# Patient Record
Sex: FEMALE | Race: WHITE | NOT HISPANIC OR LATINO
[De-identification: names, ages, dates, MRNs, and addresses within clinical notes are randomized per-mention and may not be internally consistent; named-entity substitution may affect disease eponyms.]

---

## 2018-10-11 ENCOUNTER — APPOINTMENT (OUTPATIENT)
Dept: OPHTHALMOLOGY | Facility: CLINIC | Age: 28
End: 2018-10-11
Payer: COMMERCIAL

## 2018-10-11 PROBLEM — Z00.00 ENCOUNTER FOR PREVENTIVE HEALTH EXAMINATION: Status: ACTIVE | Noted: 2018-10-11

## 2018-10-11 PROCEDURE — 92004 COMPRE OPH EXAM NEW PT 1/>: CPT

## 2018-10-11 PROCEDURE — 92015 DETERMINE REFRACTIVE STATE: CPT

## 2019-11-26 ENCOUNTER — APPOINTMENT (OUTPATIENT)
Dept: OPHTHALMOLOGY | Facility: CLINIC | Age: 29
End: 2019-11-26
Payer: COMMERCIAL

## 2019-11-26 ENCOUNTER — NON-APPOINTMENT (OUTPATIENT)
Age: 29
End: 2019-11-26

## 2019-11-26 PROCEDURE — 92014 COMPRE OPH EXAM EST PT 1/>: CPT

## 2020-02-18 ENCOUNTER — NON-APPOINTMENT (OUTPATIENT)
Age: 30
End: 2020-02-18

## 2020-02-18 ENCOUNTER — APPOINTMENT (OUTPATIENT)
Dept: OPHTHALMOLOGY | Facility: CLINIC | Age: 30
End: 2020-02-18
Payer: COMMERCIAL

## 2020-02-18 PROCEDURE — 92014 COMPRE OPH EXAM EST PT 1/>: CPT

## 2021-05-14 ENCOUNTER — APPOINTMENT (OUTPATIENT)
Dept: GASTROENTEROLOGY | Facility: CLINIC | Age: 31
End: 2021-05-14

## 2021-08-04 ENCOUNTER — NON-APPOINTMENT (OUTPATIENT)
Age: 31
End: 2021-08-04

## 2021-08-04 ENCOUNTER — APPOINTMENT (OUTPATIENT)
Dept: OPHTHALMOLOGY | Facility: CLINIC | Age: 31
End: 2021-08-04
Payer: SELF-PAY

## 2021-08-04 PROCEDURE — 92014 COMPRE OPH EXAM EST PT 1/>: CPT

## 2021-08-10 ENCOUNTER — EMERGENCY (EMERGENCY)
Facility: HOSPITAL | Age: 31
LOS: 1 days | Discharge: HOME | End: 2021-08-10
Attending: EMERGENCY MEDICINE | Admitting: EMERGENCY MEDICINE
Payer: SELF-PAY

## 2021-08-10 VITALS
WEIGHT: 293 LBS | OXYGEN SATURATION: 98 % | SYSTOLIC BLOOD PRESSURE: 141 MMHG | HEIGHT: 70 IN | HEART RATE: 92 BPM | RESPIRATION RATE: 19 BRPM | DIASTOLIC BLOOD PRESSURE: 83 MMHG | TEMPERATURE: 100 F

## 2021-08-10 VITALS
OXYGEN SATURATION: 99 % | DIASTOLIC BLOOD PRESSURE: 83 MMHG | SYSTOLIC BLOOD PRESSURE: 134 MMHG | RESPIRATION RATE: 18 BRPM | HEART RATE: 82 BPM

## 2021-08-10 DIAGNOSIS — F31.9 BIPOLAR DISORDER, UNSPECIFIED: ICD-10-CM

## 2021-08-10 DIAGNOSIS — R51.9 HEADACHE, UNSPECIFIED: ICD-10-CM

## 2021-08-10 DIAGNOSIS — H60.501 UNSPECIFIED ACUTE NONINFECTIVE OTITIS EXTERNA, RIGHT EAR: ICD-10-CM

## 2021-08-10 DIAGNOSIS — Z88.4 ALLERGY STATUS TO ANESTHETIC AGENT: ICD-10-CM

## 2021-08-10 DIAGNOSIS — H92.01 OTALGIA, RIGHT EAR: ICD-10-CM

## 2021-08-10 LAB
ALBUMIN SERPL ELPH-MCNC: 4.6 G/DL — SIGNIFICANT CHANGE UP (ref 3.3–5)
ALP SERPL-CCNC: 75 U/L — SIGNIFICANT CHANGE UP (ref 40–120)
ALT FLD-CCNC: 13 U/L — SIGNIFICANT CHANGE UP (ref 10–45)
ANION GAP SERPL CALC-SCNC: 10 MMOL/L — SIGNIFICANT CHANGE UP (ref 5–17)
AST SERPL-CCNC: 14 U/L — SIGNIFICANT CHANGE UP (ref 10–40)
BASOPHILS # BLD AUTO: 0.05 K/UL — SIGNIFICANT CHANGE UP (ref 0–0.2)
BASOPHILS NFR BLD AUTO: 0.4 % — SIGNIFICANT CHANGE UP (ref 0–2)
BILIRUB SERPL-MCNC: 0.3 MG/DL — SIGNIFICANT CHANGE UP (ref 0.2–1.2)
BUN SERPL-MCNC: 9 MG/DL — SIGNIFICANT CHANGE UP (ref 7–23)
CALCIUM SERPL-MCNC: 10.2 MG/DL — SIGNIFICANT CHANGE UP (ref 8.4–10.5)
CHLORIDE SERPL-SCNC: 100 MMOL/L — SIGNIFICANT CHANGE UP (ref 96–108)
CO2 SERPL-SCNC: 27 MMOL/L — SIGNIFICANT CHANGE UP (ref 22–31)
CREAT SERPL-MCNC: 0.71 MG/DL — SIGNIFICANT CHANGE UP (ref 0.5–1.3)
EOSINOPHIL # BLD AUTO: 0.03 K/UL — SIGNIFICANT CHANGE UP (ref 0–0.5)
EOSINOPHIL NFR BLD AUTO: 0.3 % — SIGNIFICANT CHANGE UP (ref 0–6)
GLUCOSE SERPL-MCNC: 111 MG/DL — HIGH (ref 70–99)
HCT VFR BLD CALC: 38.9 % — SIGNIFICANT CHANGE UP (ref 34.5–45)
HGB BLD-MCNC: 12.5 G/DL — SIGNIFICANT CHANGE UP (ref 11.5–15.5)
HIV 1+2 AB+HIV1 P24 AG SERPL QL IA: SIGNIFICANT CHANGE UP
IMM GRANULOCYTES NFR BLD AUTO: 0.3 % — SIGNIFICANT CHANGE UP (ref 0–1.5)
LYMPHOCYTES # BLD AUTO: 1.57 K/UL — SIGNIFICANT CHANGE UP (ref 1–3.3)
LYMPHOCYTES # BLD AUTO: 13.1 % — SIGNIFICANT CHANGE UP (ref 13–44)
MCHC RBC-ENTMCNC: 28.8 PG — SIGNIFICANT CHANGE UP (ref 27–34)
MCHC RBC-ENTMCNC: 32.1 GM/DL — SIGNIFICANT CHANGE UP (ref 32–36)
MCV RBC AUTO: 89.6 FL — SIGNIFICANT CHANGE UP (ref 80–100)
MONOCYTES # BLD AUTO: 0.55 K/UL — SIGNIFICANT CHANGE UP (ref 0–0.9)
MONOCYTES NFR BLD AUTO: 4.6 % — SIGNIFICANT CHANGE UP (ref 2–14)
NEUTROPHILS # BLD AUTO: 9.75 K/UL — HIGH (ref 1.8–7.4)
NEUTROPHILS NFR BLD AUTO: 81.3 % — HIGH (ref 43–77)
NRBC # BLD: 0 /100 WBCS — SIGNIFICANT CHANGE UP (ref 0–0)
PLATELET # BLD AUTO: 319 K/UL — SIGNIFICANT CHANGE UP (ref 150–400)
POTASSIUM SERPL-MCNC: 4.3 MMOL/L — SIGNIFICANT CHANGE UP (ref 3.5–5.3)
POTASSIUM SERPL-SCNC: 4.3 MMOL/L — SIGNIFICANT CHANGE UP (ref 3.5–5.3)
PROT SERPL-MCNC: 7.7 G/DL — SIGNIFICANT CHANGE UP (ref 6–8.3)
RBC # BLD: 4.34 M/UL — SIGNIFICANT CHANGE UP (ref 3.8–5.2)
RBC # FLD: 13.4 % — SIGNIFICANT CHANGE UP (ref 10.3–14.5)
SODIUM SERPL-SCNC: 137 MMOL/L — SIGNIFICANT CHANGE UP (ref 135–145)
WBC # BLD: 11.98 K/UL — HIGH (ref 3.8–10.5)
WBC # FLD AUTO: 11.98 K/UL — HIGH (ref 3.8–10.5)

## 2021-08-10 PROCEDURE — G1004: CPT

## 2021-08-10 PROCEDURE — 96375 TX/PRO/DX INJ NEW DRUG ADDON: CPT | Mod: XU

## 2021-08-10 PROCEDURE — 70487 CT MAXILLOFACIAL W/DYE: CPT | Mod: MG

## 2021-08-10 PROCEDURE — 85025 COMPLETE CBC W/AUTO DIFF WBC: CPT

## 2021-08-10 PROCEDURE — 87389 HIV-1 AG W/HIV-1&-2 AB AG IA: CPT

## 2021-08-10 PROCEDURE — 80053 COMPREHEN METABOLIC PANEL: CPT

## 2021-08-10 PROCEDURE — 36415 COLL VENOUS BLD VENIPUNCTURE: CPT

## 2021-08-10 PROCEDURE — 99284 EMERGENCY DEPT VISIT MOD MDM: CPT | Mod: 25

## 2021-08-10 PROCEDURE — 99285 EMERGENCY DEPT VISIT HI MDM: CPT

## 2021-08-10 PROCEDURE — 70487 CT MAXILLOFACIAL W/DYE: CPT | Mod: 26,MG

## 2021-08-10 PROCEDURE — 96374 THER/PROPH/DIAG INJ IV PUSH: CPT | Mod: XU

## 2021-08-10 RX ORDER — OXYCODONE AND ACETAMINOPHEN 5; 325 MG/1; MG/1
1 TABLET ORAL ONCE
Refills: 0 | Status: DISCONTINUED | OUTPATIENT
Start: 2021-08-10 | End: 2021-08-10

## 2021-08-10 RX ORDER — CIPROFLOXACIN AND DEXAMETHASONE 3; 1 MG/ML; MG/ML
2 SUSPENSION/ DROPS AURICULAR (OTIC) ONCE
Refills: 0 | Status: DISCONTINUED | OUTPATIENT
Start: 2021-08-10 | End: 2021-08-10

## 2021-08-10 RX ORDER — CIPROFLOXACIN AND DEXAMETHASONE 3; 1 MG/ML; MG/ML
4 SUSPENSION/ DROPS AURICULAR (OTIC)
Qty: 100 | Refills: 0
Start: 2021-08-10 | End: 2021-08-19

## 2021-08-10 RX ORDER — MORPHINE SULFATE 50 MG/1
1 CAPSULE, EXTENDED RELEASE ORAL ONCE
Refills: 0 | Status: DISCONTINUED | OUTPATIENT
Start: 2021-08-10 | End: 2021-08-10

## 2021-08-10 RX ORDER — KETOROLAC TROMETHAMINE 30 MG/ML
15 SYRINGE (ML) INJECTION ONCE
Refills: 0 | Status: DISCONTINUED | OUTPATIENT
Start: 2021-08-10 | End: 2021-08-10

## 2021-08-10 RX ORDER — CIPROFLOXACIN LACTATE 400MG/40ML
1 VIAL (ML) INTRAVENOUS
Qty: 28 | Refills: 0
Start: 2021-08-10 | End: 2021-08-23

## 2021-08-10 RX ORDER — CIPROFLOXACIN HCL 0.3 %
2 DROPS OPHTHALMIC (EYE) ONCE
Refills: 0 | Status: COMPLETED | OUTPATIENT
Start: 2021-08-10 | End: 2021-08-10

## 2021-08-10 RX ADMIN — Medication 15 MILLIGRAM(S): at 11:03

## 2021-08-10 RX ADMIN — OXYCODONE AND ACETAMINOPHEN 1 TABLET(S): 5; 325 TABLET ORAL at 16:33

## 2021-08-10 RX ADMIN — Medication 2 DROP(S): at 16:23

## 2021-08-10 RX ADMIN — MORPHINE SULFATE 1 MILLIGRAM(S): 50 CAPSULE, EXTENDED RELEASE ORAL at 15:41

## 2021-08-10 RX ADMIN — MORPHINE SULFATE 1 MILLIGRAM(S): 50 CAPSULE, EXTENDED RELEASE ORAL at 16:23

## 2021-08-10 RX ADMIN — Medication 15 MILLIGRAM(S): at 12:00

## 2021-08-10 RX ADMIN — OXYCODONE AND ACETAMINOPHEN 1 TABLET(S): 5; 325 TABLET ORAL at 16:25

## 2021-08-10 NOTE — ED ADULT NURSE NOTE - OBJECTIVE STATEMENT
pt is 31y female, here for RT ear swelling and pain and vertigo since Saturday, pt denies any fevers or respiratory sx, pt ambulatory with steady gait, R ear noticeably swollen but no redness or drainage noted, NAD present

## 2021-08-10 NOTE — ED PROVIDER NOTE - CLINICAL SUMMARY MEDICAL DECISION MAKING FREE TEXT BOX
30 y/o F w/ PMHx bipolar disorder, endometriosis presents today referred by urgent care for 3 days of right ear pain/HA.  on exam pt afebrile, appears uncomfortable but nad, right ear w/ swelling of ear canal, unable to visualize TM secondary to pain and swelling of canal, yellow discharge noted, no bleeding, tenderness w/ movement of pinna; tenderness over mastoid and TMJ joint- no bulging/skin changes; left TM and canal clear; no maxilla or mandibular swelling, no trismus; uvula midline, no tonsilar exudates;  neck w/ right cervical and submandibular lymphadenopathy, supple, no meningeal signs.  likely severe AOE but concern for mastoiditis or deeper infection.  will obtain labs, CT maxillofacial and toradol for pain.

## 2021-08-10 NOTE — ED PROVIDER NOTE - NSFOLLOWUPINSTRUCTIONS_ED_ALL_ED_FT
Please take full course of oral and otic antibiotics as prescribed.      Take tylenol 650mg or motrin 400-800mg as needed every 4-6 hours for pain.   You can take percocet for severe pain    Call to make appointment with ENT specialist for Friday.    You may call our referrals coordinator at [phone] Monday to Friday 11am-7pm for assistance with making an appointment      Otitis Externa       Otitis externa is an infection of the outer ear canal. The outer ear canal is the area between the outside of the ear and the eardrum. Otitis externa is sometimes called swimmer's ear.      What are the causes?  Common causes of this condition include:  •Swimming in dirty water.      •Moisture in the ear.      •An injury to the inside of the ear.      •An object stuck in the ear.      •A cut or scrape on the outside of the ear.        What increases the risk?    You are more likely to develop this condition if you go swimming often.      What are the signs or symptoms?  The first symptom of this condition is often itching in the ear. Later symptoms of the condition include:  •Swelling of the ear.      •Redness in the ear.      •Ear pain. The pain may get worse when you pull on your ear.      •Pus coming from the ear.        How is this diagnosed?    This condition may be diagnosed by examining the ear and testing fluid from the ear for bacteria and funguses.      How is this treated?  This condition may be treated with:  •Antibiotic ear drops. These are often given for 10–14 days.      •Medicines to reduce itching and swelling.        Follow these instructions at home:    •If you were prescribed antibiotic ear drops, use them as told by your health care provider. Do not stop using the antibiotic even if your condition improves.      •Take over-the-counter and prescription medicines only as told by your health care provider.      •Avoid getting water in your ears as told by your health care provider. This may include avoiding swimming or water sports for a few days.      •Keep all follow-up visits as told by your health care provider. This is important.        How is this prevented?    •Keep your ears dry. Use the corner of a towel to dry your ears after you swim or bathe.      •Avoid scratching or putting things in your ear. Doing these things can damage the ear canal or remove the protective wax that lines it, which makes it easier for bacteria and funguses to grow.      •Avoid swimming in lakes, polluted water, or pools that may not have enough chlorine.        Contact a health care provider if:    •You have a fever.      •Your ear is still red, swollen, painful, or draining pus after 3 days.      •Your redness, swelling, or pain gets worse.      •You have a severe headache.      •You have redness, swelling, pain, or tenderness in the area behind your ear.        Summary    •Otitis externa is an infection of the outer ear canal.      •Common causes include swimming in dirty water, moisture in the ear, or a cut or scrape in the ear.      •Symptoms include pain, redness, and swelling of the ear.      •If you were prescribed antibiotic ear drops, use them as told by your health care provider. Do not stop using the antibiotic even if your condition improves.      This information is not intended to replace advice given to you by your health care provider. Make sure you discuss any questions you have with your health care provider.

## 2021-08-10 NOTE — ED ADULT TRIAGE NOTE - CHIEF COMPLAINT QUOTE
"I started to have pain to my right ear 3 days ago and today at urgent care they told me to come to ER to get CT and see ENT".

## 2021-08-10 NOTE — ED PROVIDER NOTE - OBJECTIVE STATEMENT
32 y/o F w/ PMHx bipolar disorder, endometriosis presents today referred by urgent care for 3 days of right ear pain. Patient reports that she was swimming this weekend, now has dull, aching pain in her right ear radiating into her right jaw behind her ear w/ difficulty opening her mouth secondary to pain and throat discomfort w/ swallowing. Patient used over the counter ear drops without relief, went to urgent care and was referred to the ED w/ concern for mastoiditis. Patient also notes greenish discharge from ear since yesterday. Denies difficulty swallowing or breathing, fevers, chills, dizziness, fainting, vision changes, neck pain or stiffness, chest pain, SOB, vomiting, or trauma. Patient also notes diffuse right-sided headache w/ nausea. 32 y/o F w/ PMHx bipolar disorder, endometriosis presents today referred by urgent care for 3 days of right ear pain/HA. Patient reports that she was swimming this weekend, now has dull, throbbing pain in her right ear radiating into her right jaw behind her ear w/ difficulty opening her mouth secondary to pain and throat discomfort w/ swallowing. Patient used over the counter ear drops without relief, went to urgent care and was referred to the ED w/ concern for mastoiditis. Patient also notes greenish discharge from ear since yesterday and nausea. Denies difficulty swallowing or breathing, fevers, chills, dizziness, fainting, vision changes, neck pain or stiffness, chest pain, SOB, vomiting, or trauma.

## 2021-08-10 NOTE — ED PROVIDER NOTE - PROGRESS NOTE DETAILS
CT concern for early malignant otitis externa.  seen by ENT and recommend PO/otic ciprox and f/u with outpt on Friday.  rxs sent and discussed strict return parameters

## 2021-08-10 NOTE — ED PROVIDER NOTE - DISPOSITION TYPE
Billing Type: Third-Party Bill Bill For Surgical Tray: no Expected Date Of Service: 12/10/2019 Performing Laboratory: [id number] DISCHARGE

## 2021-08-10 NOTE — CONSULT NOTE ADULT - SUBJECTIVE AND OBJECTIVE BOX
31F w/ PMH Crohn's disease presenting for right ear pain of 4 days duration. Patient states that the pain started Saturday and was associated with headache at that time. It gradually progressed to become severe pain that radiates down the neck and to the jaw. She says her hearing is decreased on the right and she feels off balance. States that she's been having regular drainage from the ear and pain with opening her mouth that has stopped her from eating. Denies history of ear surgery or ear infections. Denies fevers, history of malignancy or immunosuppression. States she takes 5ASA for her Crohn's disease and no immunosuppressants.    Allergies    ketamine (Unknown)    Intolerances    PAST MEDICAL & SURGICAL HISTORY:      MEDICATIONS:  Antiinfectives:     Hematologic/Anticoagulation:    Pain medications/Neuro:    IV fluids:    Endocrine Medications:     All other standing medications:     All other PRN medications:        REVIEW OF SYSTEMS:     LABS:  CBC-                        12.5   11.98 )-----------( 319      ( 10 Aug 2021 11:16 )             38.9         08-10    137  |  100  |  9   ----------------------------<  111<H>  4.3   |  27  |  0.71    Ca    10.2      10 Aug 2021 11:16    TPro  7.7  /  Alb  4.6  /  TBili  0.3  /  DBili  x   /  AST  14  /  ALT  13  /  AlkPhos  75  08-10    Coagulation Studies-    Endocrine Panel-  --  --  10.2 mg/dL      Vital Signs Last 24 Hrs  T(C): 37.2 (10 Aug 2021 13:31), Max: 37.7 (10 Aug 2021 10:04)  T(F): 99 (10 Aug 2021 13:31), Max: 99.9 (10 Aug 2021 10:04)  HR: 82 (10 Aug 2021 16:23) (81 - 92)  BP: 134/83 (10 Aug 2021 16:23) (115/78 - 141/83)  BP(mean): --  RR: 18 (10 Aug 2021 16:23) (18 - 19)  SpO2: 99% (10 Aug 2021 16:23) (98% - 99%)    PHYSICAL EXAM:  ENT EXAM-   Constitutional: Well-developed, well-nourished. No hoarseness.     Head:  normocephalic, atraumatic.   AD: mastoid tender, flat, no erythema, Pinna with some erythema in abdoul, tender to palpation, TMJ tender to palpation, significant erythema and edema of R EAC, small amount of purulent drainage- suctioned, small portion of superior, posterior TM visible, non-erythematous, no bulging. Ear wick placed into right EAC.  AS: mastoid flat, non-tender, pinna clear, non-tender, no erythema, EAC clear, no erythema, no lesions, TM flat, no erythema, no perforation, no effusion  Nose:  Septum intact, Inferior turbinates normal bilateral  OC/OP:  Tonsils 3+. Floor of mouth, buccal mucosa, lips, hard palate, soft palate, uvula, posterior pharyngeal wall normal.  Mucosa moist.  Neck:  R neck tender to palpation at level II, level III  Lymph: palpable 1cm LAD in R level III,     MULTISYSTEM EXAM-  Neuro/Psych:  A&O x 3.  Mood stable.  Affect bright.  Cranial nerves: 2-12 grossly intact bilaterally.  Eyes:  EOMI, no nystagmus.  Pulm:  No dyspnea, non-labored breathing  Cardiovascular: Carotid pulses 2+ bilaterally.  No periphreal edema.  Skin:  No rash or lesions on exposed skin of head/neck    RADIOLOGY & ADDITIONAL STUDIES:    EXAM:  CT MAXILLOFACIAL  IC                          PROCEDURE DATE:  08/10/2021          INTERPRETATION:  PROCEDURE: CT maxillofacial with contrast    INDICATION: Ear pain; rule out mastoiditis    TECHNIQUE: Multiple thin section axial images were obtained from the hyoid bone to the superior aspect of the frontal sinuses following the intravenous administration of 85 ml of Optiray 350. These images were used to create 2 mm thick sagittal, axial and coronal reformatted images through the facial region. The images were reviewed in bone and soft-tissue windows.    COMPARISON: None    FINDINGS: The CT examination demonstrates circumferential swelling with enhancing soft tissue along the right external auditory canal (series 4 image 44 and Key image 1). The enhancing soft tissues extends along the right preauricular soft tissues. There is no fluid collection or discrete abscess. There is no bony erosion involving the external auditory canal or adjacent mastoid segment.    However, there is asymmetric widening of the right temporomandibular joint with a suggestion of enhancing soft tissue within the posterior aspect of the right temporomandibular joint space (series 4 images 41-42). There is flattening of the right condylar head with beaking which may be secondary to underlying TMJ disease. No cortical erosion is identified.    The right tympanic membrane also appears thickened. There is soft tissue within the right mesotympanum and hypotympanum. There also is soft tissue opacification and fluid within right mastoid air cells. The mastoid septae appear intact.    These findings are suggestive of early necrotizing otitis externum given the extension to the right temporomandibular joint space as well as involvement of the mastoid segment.    There are arm multiple small preauricular and periparotid lymph nodes. All the lymph nodes are homogeneous without necrosis or cystic change.    Evaluation of the orbits demonstrates the globes to be normal in contour. The lenses are located. The extraocular muscles and optic nerve sheath appear within normal limits. The retrobulbar fat is intact. The osseous structures demonstrate no fractures.    Limited evaluation of the brain parenchyma demonstrates no abnormalities or abnormal enhancement.    IMPRESSION: Findings suggestive of early right necrotizing otitis externa.    --- End of Report ---      Thank you for the opportunity to participate in the care of this patient.      ASHELY VINCENT MD; Attending Radiologist  This document has been electronically signed. Aug 10 2021  1:38PM      A/P:  31y Female w/ PMH Crohn's disease presenting with severe otitis externa with pain with pain and tenderness extending outside the EAC. Ear wick placed to right EAC.    Plan:  Ciprodex drops (4 drops) to right ear BID x10 days  Ciprofloxacin PO x14 days  Tylenol/Motrin for pain control PRN  Follow up with Dr. Stas Wilder for ear wick removal Friday, 8/13  Please page ENT with any concerns/questions    Discussed with Dr. Wilder

## 2021-08-10 NOTE — ED ADULT NURSE NOTE - NSIMPLEMENTINTERV_GEN_ALL_ED
Implemented All Universal Safety Interventions:  [place] to call system. Call bell, personal items and telephone within reach. Instruct patient to call for assistance. Room bathroom lighting operational. Non-slip footwear when patient is off stretcher. Physically safe environment: no spills, clutter or unnecessary equipment. Stretcher in lowest position, wheels locked, appropriate side rails in place.

## 2021-08-10 NOTE — ED PROVIDER NOTE - PHYSICAL EXAMINATION
Vitals reviewed  Gen: well appearing, nad, speaking in full sentences  Skin: wwp, no rash/lesions  HEENT: ncat, eomi, mmm; right ear w/ swelling of ear canal, unable to visualize TM secondary to pain and swelling of canal, yellow discharge noted, no bleeding, tenderness w/ movement of pinna; tenderness over mastoid and TMJ joint; left ear TM and canal clear; no maxilla or mandibular swelling, no trismus; uvula midline, no tonsilar exudates; neck w/ right cervical and mandibula lymphadenopathy, supple, no meningeal signs  CV: rrr, no audible m/r/g  Resp: symmetrical expansion, ctab, no w/r/r  Abd: nondistended, soft/nt  Ext: FROM throughout, no peripheral edema  Neuro: alert/oriented, no focal deficits, steady gait Vitals reviewed  Gen: appears uncomfortable but nad, speaking in full sentences  Skin: wwp, no rash/lesions  HEENT: ncat, eomi, mmm; right ear w/ swelling of ear canal, unable to visualize TM secondary to pain and swelling of canal, yellow discharge noted, no bleeding, tenderness w/ movement of pinna; tenderness over mastoid and TMJ joint- no bulging/skin changes; left TM and canal clear; no maxilla or mandibular swelling, no trismus; uvula midline, no tonsilar exudates  Neck w/ right cervical and submandibular lymphadenopathy, supple, no meningeal signs  CV: rrr, no audible m/r/g  Resp: symmetrical expansion, ctab, no w/r/r  Ext: FROM throughout, no peripheral edema  Neuro: alert/oriented, no focal deficits, steady gait

## 2021-08-10 NOTE — ED PROVIDER NOTE - ATTENDING CONTRIBUTION TO CARE
32 y/o F w/ PMHx bipolar disorder, endometriosis presents today referred by urgent care for 3 days of right ear pain/HA.  on exam pt afebrile, appears uncomfortable but nad, right ear w/ swelling of ear canal, unable to visualize TM secondary to pain and swelling of canal, yellow discharge noted, no bleeding, tenderness w/ movement of pinna; tenderness over mastoid and TMJ joint- no bulging/skin changes; left TM and canal clear; no maxilla or mandibular swelling, no trismus; uvula midline, no tonsilar exudates;  neck w/ right cervical and submandibular lymphadenopathy, supple, no meningeal signs.  likely severe AOE but concern for mastoiditis or deeper infection.  will obtain labs, CT maxillofacial and toradol for pain.    Agree with above note as documented by PA.  CT performed with findings suggestive of early right necrotizing otitis externa.     ENT consulted in ED and ear wick placed in addition to prescribing ciprodex and oral cipro.  Pt instructed to follow up with ENT as outpt and informed of symptoms which would warrant return.

## 2021-08-10 NOTE — ED ADULT NURSE NOTE - CHPI ED NUR SYMPTOMS NEG
no bleeding gums/no chills/no fever/no loss of consciousness/no numbness/no syncope/no vomiting/no weakness

## 2021-08-11 ENCOUNTER — INPATIENT (INPATIENT)
Facility: HOSPITAL | Age: 31
LOS: 4 days | Discharge: HOME | DRG: 872 | End: 2021-08-16
Attending: STUDENT IN AN ORGANIZED HEALTH CARE EDUCATION/TRAINING PROGRAM | Admitting: STUDENT IN AN ORGANIZED HEALTH CARE EDUCATION/TRAINING PROGRAM
Payer: COMMERCIAL

## 2021-08-11 VITALS
HEIGHT: 70 IN | HEART RATE: 97 BPM | SYSTOLIC BLOOD PRESSURE: 132 MMHG | WEIGHT: 293 LBS | OXYGEN SATURATION: 95 % | DIASTOLIC BLOOD PRESSURE: 79 MMHG | RESPIRATION RATE: 18 BRPM | TEMPERATURE: 100 F

## 2021-08-11 DIAGNOSIS — K51.90 ULCERATIVE COLITIS, UNSPECIFIED, WITHOUT COMPLICATIONS: ICD-10-CM

## 2021-08-11 DIAGNOSIS — F31.9 BIPOLAR DISORDER, UNSPECIFIED: ICD-10-CM

## 2021-08-11 DIAGNOSIS — R63.8 OTHER SYMPTOMS AND SIGNS CONCERNING FOOD AND FLUID INTAKE: ICD-10-CM

## 2021-08-11 DIAGNOSIS — A41.9 SEPSIS, UNSPECIFIED ORGANISM: ICD-10-CM

## 2021-08-11 DIAGNOSIS — H60.21 MALIGNANT OTITIS EXTERNA, RIGHT EAR: ICD-10-CM

## 2021-08-11 DIAGNOSIS — N80.9 ENDOMETRIOSIS, UNSPECIFIED: ICD-10-CM

## 2021-08-11 LAB
ALBUMIN SERPL ELPH-MCNC: 4.3 G/DL — SIGNIFICANT CHANGE UP (ref 3.3–5)
ALP SERPL-CCNC: 71 U/L — SIGNIFICANT CHANGE UP (ref 40–120)
ALT FLD-CCNC: 14 U/L — SIGNIFICANT CHANGE UP (ref 10–45)
ANION GAP SERPL CALC-SCNC: 12 MMOL/L — SIGNIFICANT CHANGE UP (ref 5–17)
AST SERPL-CCNC: 14 U/L — SIGNIFICANT CHANGE UP (ref 10–40)
BASOPHILS # BLD AUTO: 0.04 K/UL — SIGNIFICANT CHANGE UP (ref 0–0.2)
BASOPHILS NFR BLD AUTO: 0.4 % — SIGNIFICANT CHANGE UP (ref 0–2)
BILIRUB SERPL-MCNC: 0.3 MG/DL — SIGNIFICANT CHANGE UP (ref 0.2–1.2)
BUN SERPL-MCNC: 6 MG/DL — LOW (ref 7–23)
CALCIUM SERPL-MCNC: 9.8 MG/DL — SIGNIFICANT CHANGE UP (ref 8.4–10.5)
CHLORIDE SERPL-SCNC: 98 MMOL/L — SIGNIFICANT CHANGE UP (ref 96–108)
CO2 SERPL-SCNC: 25 MMOL/L — SIGNIFICANT CHANGE UP (ref 22–31)
CREAT SERPL-MCNC: 0.69 MG/DL — SIGNIFICANT CHANGE UP (ref 0.5–1.3)
EOSINOPHIL # BLD AUTO: 0.01 K/UL — SIGNIFICANT CHANGE UP (ref 0–0.5)
EOSINOPHIL NFR BLD AUTO: 0.1 % — SIGNIFICANT CHANGE UP (ref 0–6)
GLUCOSE SERPL-MCNC: 138 MG/DL — HIGH (ref 70–99)
HCT VFR BLD CALC: 37.7 % — SIGNIFICANT CHANGE UP (ref 34.5–45)
HGB BLD-MCNC: 12.1 G/DL — SIGNIFICANT CHANGE UP (ref 11.5–15.5)
IMM GRANULOCYTES NFR BLD AUTO: 0.3 % — SIGNIFICANT CHANGE UP (ref 0–1.5)
LACTATE SERPL-SCNC: 1.1 MMOL/L — SIGNIFICANT CHANGE UP (ref 0.5–2)
LYMPHOCYTES # BLD AUTO: 1.69 K/UL — SIGNIFICANT CHANGE UP (ref 1–3.3)
LYMPHOCYTES # BLD AUTO: 17.2 % — SIGNIFICANT CHANGE UP (ref 13–44)
MCHC RBC-ENTMCNC: 28.4 PG — SIGNIFICANT CHANGE UP (ref 27–34)
MCHC RBC-ENTMCNC: 32.1 GM/DL — SIGNIFICANT CHANGE UP (ref 32–36)
MCV RBC AUTO: 88.5 FL — SIGNIFICANT CHANGE UP (ref 80–100)
MONOCYTES # BLD AUTO: 0.57 K/UL — SIGNIFICANT CHANGE UP (ref 0–0.9)
MONOCYTES NFR BLD AUTO: 5.8 % — SIGNIFICANT CHANGE UP (ref 2–14)
NEUTROPHILS # BLD AUTO: 7.5 K/UL — HIGH (ref 1.8–7.4)
NEUTROPHILS NFR BLD AUTO: 76.2 % — SIGNIFICANT CHANGE UP (ref 43–77)
NRBC # BLD: 0 /100 WBCS — SIGNIFICANT CHANGE UP (ref 0–0)
PLATELET # BLD AUTO: 292 K/UL — SIGNIFICANT CHANGE UP (ref 150–400)
POTASSIUM SERPL-MCNC: 3.7 MMOL/L — SIGNIFICANT CHANGE UP (ref 3.5–5.3)
POTASSIUM SERPL-SCNC: 3.7 MMOL/L — SIGNIFICANT CHANGE UP (ref 3.5–5.3)
PROT SERPL-MCNC: 7.4 G/DL — SIGNIFICANT CHANGE UP (ref 6–8.3)
RBC # BLD: 4.26 M/UL — SIGNIFICANT CHANGE UP (ref 3.8–5.2)
RBC # FLD: 13.7 % — SIGNIFICANT CHANGE UP (ref 10.3–14.5)
SARS-COV-2 RNA SPEC QL NAA+PROBE: SIGNIFICANT CHANGE UP
SODIUM SERPL-SCNC: 135 MMOL/L — SIGNIFICANT CHANGE UP (ref 135–145)
WBC # BLD: 9.84 K/UL — SIGNIFICANT CHANGE UP (ref 3.8–10.5)
WBC # FLD AUTO: 9.84 K/UL — SIGNIFICANT CHANGE UP (ref 3.8–10.5)

## 2021-08-11 PROCEDURE — 99285 EMERGENCY DEPT VISIT HI MDM: CPT

## 2021-08-11 PROCEDURE — 99223 1ST HOSP IP/OBS HIGH 75: CPT | Mod: GC

## 2021-08-11 RX ORDER — CIPROFLOXACIN LACTATE 400MG/40ML
400 VIAL (ML) INTRAVENOUS ONCE
Refills: 0 | Status: DISCONTINUED | OUTPATIENT
Start: 2021-08-11 | End: 2021-08-11

## 2021-08-11 RX ORDER — LAMOTRIGINE 25 MG/1
300 TABLET, ORALLY DISINTEGRATING ORAL DAILY
Refills: 0 | Status: DISCONTINUED | OUTPATIENT
Start: 2021-08-11 | End: 2021-08-16

## 2021-08-11 RX ORDER — LAMOTRIGINE 25 MG/1
300 TABLET, ORALLY DISINTEGRATING ORAL
Qty: 0 | Refills: 0 | DISCHARGE

## 2021-08-11 RX ORDER — SODIUM CHLORIDE 9 MG/ML
1000 INJECTION INTRAMUSCULAR; INTRAVENOUS; SUBCUTANEOUS ONCE
Refills: 0 | Status: DISCONTINUED | OUTPATIENT
Start: 2021-08-11 | End: 2021-08-12

## 2021-08-11 RX ORDER — LEVOTHYROXINE SODIUM 125 MCG
1 TABLET ORAL
Qty: 0 | Refills: 0 | DISCHARGE

## 2021-08-11 RX ORDER — MORPHINE SULFATE 50 MG/1
4 CAPSULE, EXTENDED RELEASE ORAL ONCE
Refills: 0 | Status: DISCONTINUED | OUTPATIENT
Start: 2021-08-11 | End: 2021-08-11

## 2021-08-11 RX ORDER — VANCOMYCIN HCL 1 G
2000 VIAL (EA) INTRAVENOUS EVERY 12 HOURS
Refills: 0 | Status: DISCONTINUED | OUTPATIENT
Start: 2021-08-12 | End: 2021-08-12

## 2021-08-11 RX ORDER — LEVOTHYROXINE SODIUM 125 MCG
25 TABLET ORAL DAILY
Refills: 0 | Status: DISCONTINUED | OUTPATIENT
Start: 2021-08-11 | End: 2021-08-16

## 2021-08-11 RX ORDER — CIPROFLOXACIN HCL 0.3 %
4 DROPS OPHTHALMIC (EYE) EVERY 12 HOURS
Refills: 0 | Status: DISCONTINUED | OUTPATIENT
Start: 2021-08-11 | End: 2021-08-12

## 2021-08-11 RX ORDER — PIPERACILLIN AND TAZOBACTAM 4; .5 G/20ML; G/20ML
4.5 INJECTION, POWDER, LYOPHILIZED, FOR SOLUTION INTRAVENOUS ONCE
Refills: 0 | Status: COMPLETED | OUTPATIENT
Start: 2021-08-11 | End: 2021-08-11

## 2021-08-11 RX ORDER — CIPROFLOXACIN AND DEXAMETHASONE 3; 1 MG/ML; MG/ML
4 SUSPENSION/ DROPS AURICULAR (OTIC)
Refills: 0 | Status: DISCONTINUED | OUTPATIENT
Start: 2021-08-11 | End: 2021-08-11

## 2021-08-11 RX ORDER — VANCOMYCIN HCL 1 G
2000 VIAL (EA) INTRAVENOUS ONCE
Refills: 0 | Status: COMPLETED | OUTPATIENT
Start: 2021-08-11 | End: 2021-08-11

## 2021-08-11 RX ORDER — SODIUM CHLORIDE 9 MG/ML
1000 INJECTION INTRAMUSCULAR; INTRAVENOUS; SUBCUTANEOUS ONCE
Refills: 0 | Status: COMPLETED | OUTPATIENT
Start: 2021-08-11 | End: 2021-08-11

## 2021-08-11 RX ORDER — ESCITALOPRAM OXALATE 10 MG/1
1 TABLET, FILM COATED ORAL
Qty: 0 | Refills: 0 | DISCHARGE

## 2021-08-11 RX ORDER — ESCITALOPRAM OXALATE 10 MG/1
20 TABLET, FILM COATED ORAL DAILY
Refills: 0 | Status: DISCONTINUED | OUTPATIENT
Start: 2021-08-11 | End: 2021-08-16

## 2021-08-11 RX ORDER — PIPERACILLIN AND TAZOBACTAM 4; .5 G/20ML; G/20ML
4.5 INJECTION, POWDER, LYOPHILIZED, FOR SOLUTION INTRAVENOUS EVERY 6 HOURS
Refills: 0 | Status: DISCONTINUED | OUTPATIENT
Start: 2021-08-11 | End: 2021-08-14

## 2021-08-11 RX ORDER — VANCOMYCIN HCL 1 G
VIAL (EA) INTRAVENOUS
Refills: 0 | Status: DISCONTINUED | OUTPATIENT
Start: 2021-08-11 | End: 2021-08-12

## 2021-08-11 RX ORDER — ACETAMINOPHEN 500 MG
650 TABLET ORAL EVERY 6 HOURS
Refills: 0 | Status: DISCONTINUED | OUTPATIENT
Start: 2021-08-11 | End: 2021-08-13

## 2021-08-11 RX ORDER — KETOROLAC TROMETHAMINE 30 MG/ML
15 SYRINGE (ML) INJECTION ONCE
Refills: 0 | Status: DISCONTINUED | OUTPATIENT
Start: 2021-08-11 | End: 2021-08-11

## 2021-08-11 RX ADMIN — Medication 650 MILLIGRAM(S): at 17:51

## 2021-08-11 RX ADMIN — MORPHINE SULFATE 4 MILLIGRAM(S): 50 CAPSULE, EXTENDED RELEASE ORAL at 16:17

## 2021-08-11 RX ADMIN — Medication 650 MILLIGRAM(S): at 18:23

## 2021-08-11 RX ADMIN — Medication 15 MILLIGRAM(S): at 21:38

## 2021-08-11 RX ADMIN — MORPHINE SULFATE 4 MILLIGRAM(S): 50 CAPSULE, EXTENDED RELEASE ORAL at 15:47

## 2021-08-11 RX ADMIN — Medication 250 MILLIGRAM(S): at 22:33

## 2021-08-11 RX ADMIN — PIPERACILLIN AND TAZOBACTAM 200 GRAM(S): 4; .5 INJECTION, POWDER, LYOPHILIZED, FOR SOLUTION INTRAVENOUS at 16:59

## 2021-08-11 RX ADMIN — SODIUM CHLORIDE 1000 MILLILITER(S): 9 INJECTION INTRAMUSCULAR; INTRAVENOUS; SUBCUTANEOUS at 15:47

## 2021-08-11 NOTE — ED PROVIDER NOTE - ATTENDING CONTRIBUTION TO CARE
I approve of the visit details performed / assessed by the Physician Assistant. The patient's visit details and orders are appropriate.    Patient with severe ear pain concerning for malignant otitis externa with extension to mastoiditis. Failed outpatient management due to severe worsening pain and extension of pain to mastoid area. Started on IV zosyn, ENT consulted and placed ear wick, admitted to medicine team. Patient is HD stable.    On exam there is R mastoid tenderness with erythema. Right pinna with severe tenderness with any palpation. There is significant exudative fibrinous material in R external auditory canal with significant tenderness. Unable to visualize right TM.   Oropharyngeal exam unremarkable. +Right cervical lymphadenopathy.

## 2021-08-11 NOTE — H&P ADULT - NSHPLABSRESULTS_GEN_ALL_CORE
.  LABS:                         12.1   9.84  )-----------( 292      ( 11 Aug 2021 15:48 )             37.7     08-11    135  |  98  |  6<L>  ----------------------------<  138<H>  3.7   |  25  |  0.69    Ca    9.8      11 Aug 2021 15:48    TPro  7.4  /  Alb  4.3  /  TBili  0.3  /  DBili  x   /  AST  14  /  ALT  14  /  AlkPhos  71  08-11              Lactate, Blood: 1.1 mmol/L (08-11 @ 15:48)      RADIOLOGY, EKG & ADDITIONAL TESTS: Reviewed.     < from: CT Maxillofacial w/ IV Cont (08.10.21 @ 12:35) >    IMPRESSION: Findings suggestive of early right necrotizing otitis externa.    < from: CT Maxillofacial w/ IV Cont (08.10.21 @ 12:35) >    However, there is asymmetric widening of the right temporomandibular joint with a suggestion of enhancing soft tissue within the posterior aspect of the right temporomandibular joint space (series 4 images 41-42). There is flattening of the right condylar head with beaking which may be secondary to underlying TMJ disease. No cortical erosion is identified.    The right tympanic membrane also appears thickened. There is soft tissue within the right mesotympanum and hypotympanum. There also is soft tissue opacification and fluid within right mastoid air cells. The mastoid septae appear intact.    < end of copied text >        < end of copied text >

## 2021-08-11 NOTE — H&P ADULT - PROBLEM SELECTOR PLAN 2
- diagnosed on colonoscopy 3 months ago   - home medication: Apriso (mesalamine), pt does not know home dose- confirm home dose in AM and start - CT scan indicative of early necrotizing otitis externa  - ENT following    Plan   - continue Vancomycin and Zosyn for broad spectrum Abx coverage   - f/u ENT recs, no acute surgical intervention at this time   - start Ciprodex ear drops (ciprofloxacin and dexamethasone ear drops)   - f/u blood cx

## 2021-08-11 NOTE — H&P ADULT - HISTORY OF PRESENT ILLNESS
ED Course:   Vitals:   Labs s/f:   EKG:   Imaging:   Interventions:     Patient admitted for  Patient is a 30 y/o F with PMH of bipolar disorder, endometriosis, and ulcerative colitis who presents for worsening right ear pain and swelling that started 4 days ago. Patient states she was swimming in beach in [place] and the following day her right her became red, swollen and painful. She came to the ED on 08/10 and was seen by ENT in the ED, was diagnosed with necrotizing otitis externa and  ENT placed Ear wick in R ear  and patient was discharged with PO ciprofloxacin, ciprodex drops, and instructions to follow up with Dr. Wilder on Friday, 8/13. She is back today due to severe pain now radiating down her jaw to her right shoulder and inability to eat 2/2 jaw pain. She denies hx of ear infections She has had fever and chills. She denies HA, blurry vision, tinnitus or hearing loss, or N/V/D.       ED Course:   Vitals: 101.3 Tmax, HR 86, /82, RR 18, 100 O2 on room air   Labs s/f: NA   Imaging:   Interventions:     Patient admitted for  Patient is a 32 y/o F with PMH of bipolar disorder, endometriosis, and ulcerative colitis who presents for worsening right ear pain and swelling that started 4 days ago. Patient states she was swimming in beach in [place] and the following day her right her became red, swollen and painful. She came to the ED on 08/10 and was seen by ENT in the ED, was diagnosed with necrotizing otitis externa and  ENT placed Ear wick in R ear  and patient was discharged with PO ciprofloxacin, ciprodex drops, and instructions to follow up with Dr. Wilder on Friday, 8/13. She is back today due to severe pain now radiating down her jaw to her right shoulder and inability to eat 2/2 jaw pain. She denies hx of ear infections She has had fever and chills. She denies HA, blurry vision, tinnitus or hearing loss, or N/V/D.       ED Course:   Vitals: 101.3 Tmax, HR 86, /82, RR 18, 100 O2 on room air   Labs s/f: NA   Imaging: CT maxillofacial: here is asymmetric widening of the right temporomandibular joint with a suggestion of enhancing soft tissue within the posterior aspect of the right temporomandibular joint space. concerning for early necrotizing otitis externa.   Interventions: Zosyn, morphine, 1LNS     Patient admitted for c/f necrotizing otitis externa.

## 2021-08-11 NOTE — H&P ADULT - PROBLEM SELECTOR PLAN 4
NTD - continue home medication, Lamictal 300mg daily     #Depression   - continue Lexapro 20mg daily

## 2021-08-11 NOTE — CONSULT NOTE ADULT - SUBJECTIVE AND OBJECTIVE BOX
31y Female w/ PMH Crohn's disease returning to ED for worseningpresenting with severe otitis externa with pain with pain and tenderness extending outside the EAC. Ear wick placed to right EAC. 31y Female seen yesterday in ED with acute severe otitis externa presenting with complaints of uncontrolled pain and inability to eat 2/2 pain. Ear wick was placed in R ear yesterday, 8/10, and patient was discharged with PO ciprofloxacin, ciprodex drops, and instructions to follow up with Dr. Wilder on Friday, 8/13. Patient states she was unable to sleep overnight or eat at all due to her pain. States she has been having some persistent drainage from the R EAC. Denies tinnitus, vertigo, headache, visual changes, nausea, vomiting.     HPI: 31y Female    Allergies    ketamine (Unknown)    Intolerances        PAST MEDICAL & SURGICAL HISTORY:  Bipolar disorder  endometriosis      Endometriosis        MEDICATIONS:  Antiinfectives:     Hematologic/Anticoagulation:    Pain medications/Neuro:    IV fluids:    Endocrine Medications:     All other standing medications:     All other PRN medications:      REVIEW OF SYSTEMS:     LABS:  CBC-                        12.1   9.84  )-----------( 292      ( 11 Aug 2021 15:48 )             37.7         08-11    135  |  98  |  6<L>  ----------------------------<  138<H>  3.7   |  25  |  0.69    Ca    9.8      11 Aug 2021 15:48    TPro  7.4  /  Alb  4.3  /  TBili  0.3  /  DBili  x   /  AST  14  /  ALT  14  /  AlkPhos  71  08-11    Coagulation Studies-    Endocrine Panel-  --  --  9.8 mg/dL  --  --  10.2 mg/dL      Vital Signs Last 24 Hrs  T(C): 38.5 (11 Aug 2021 17:24), Max: 38.5 (11 Aug 2021 17:24)  T(F): 101.3 (11 Aug 2021 17:24), Max: 101.3 (11 Aug 2021 17:24)  HR: 86 (11 Aug 2021 17:24) (86 - 97)  BP: 141/82 (11 Aug 2021 17:24) (132/79 - 141/82)  BP(mean): --  RR: 18 (11 Aug 2021 17:24) (18 - 18)  SpO2: 100% (11 Aug 2021 17:24) (95% - 100%)    PHYSICAL EXAM:  ENT EXAM-   Constitutional: Well-developed, well-nourished.  No hoarseness.     Head:  normocephalic, atraumatic.   AD: mastoid with erythema, very tender to palpation, no fluctuance, pinna erythematous, tender to palpation, no fluctuance, EAC with some purulent drainage, ear wick visible, earing to cartilage  AS: mastoid flat, non-tender, no erythema, pinna clear, no erythema, EAC clear, no lesions, TM flat, no erythema, no effusion  Nose:  Septum intact. Inferior turbinates normal bilateral  OC/OP:  Floor of mouth, buccal mucosa, lips, hard palate, soft palate, uvula, posterior pharyngeal wall normal.  Mucosa moist.  Neck:  Tenderness to palpation of R neck levels II-III        A/P:  31y Female seen in [facility] ED yesterday, 8/10, with severe right OE and s/p R ear wick placement presenting with uncontrolled pain and inability to tolerate PO foods. Ear wick in place. Mastoid erythema and tenderness worsened from yesterday.     Plan:  Recommend vancomycin/zosyn  Recommend c/w ciprodex drops to R ear BID  Please remove ear cartilage piercing  Pain control per primary team  No acute ENT intervention  ENT will continue to follow  Please page ENT with any concerns/questions      Discussed with Dr. Wilder

## 2021-08-11 NOTE — ED PROVIDER NOTE - OBJECTIVE STATEMENT
30 y/o F w/ PMHx bipolar disorder, endometriosis presents today referred by urgent care for 4 days of right ear pain/HA.  pt seen in ED yesterday and had labs and CT which shows concern for necrotizing otitis externa.  eval by ENT, ear wick placed and dc with cipro otic and PO, and percocet for pain.  pt compliant with meds.  returns due to severe/worsening pain and now having difficulty eating because of pain when opening mouth. Denies difficulty swallowing or breathing, fevers, chills, dizziness, fainting, vision changes, neck pain or stiffness, chest pain, SOB, vomiting, or trauma.

## 2021-08-11 NOTE — H&P ADULT - NSICDXPASTMEDICALHX_GEN_ALL_CORE_FT
PAST MEDICAL HISTORY:  Bipolar disorder endometriosis      Endometriosis     Ulcerative colitis

## 2021-08-11 NOTE — H&P ADULT - PROBLEM SELECTOR PLAN 5
F: s/p 1 L bolus   E: Replete as needed  N: regular   DVT ppx: non indicated     Dispo: [facility] F: s/p 1 L bolus   E: Replete as needed  N: soft for jaw pain   DVT ppx: non indicated     Dispo: [facility] NTD      #Hypothyroidism   - continue with home levothyroxine

## 2021-08-11 NOTE — H&P ADULT - NSHPPHYSICALEXAM_GEN_ALL_CORE
.  VITAL SIGNS:  T(C): 38.5 (08-11-21 @ 17:24), Max: 38.5 (08-11-21 @ 17:24)  T(F): 101.3 (08-11-21 @ 17:24), Max: 101.3 (08-11-21 @ 17:24)  HR: 86 (08-11-21 @ 17:24) (86 - 97)  BP: 141/82 (08-11-21 @ 17:24) (132/79 - 141/82)  BP(mean): --  RR: 18 (08-11-21 @ 17:24) (18 - 18)  SpO2: 100% (08-11-21 @ 17:24) (95% - 100%)  Wt(kg): --    PHYSICAL EXAM:    Constitutional: NAD   HEENT: right ear tender to palpation- severe, erythematous, edematous, tender to jawline, unable to open jaw fully, inner ear exam TM obstructed with wick in place and drainage. NC/AT, PERRL, EOMI, clear conjunctiva, uvula midline, no oropharyngeal erythema or exudates; MMM  Neck: supple; no JVD   Respiratory: CTA B/L; no W/R/R, no retractions  Cardiac: +S1/S2; RRR; no M/R/G  Gastrointestinal: soft, NT/ND; no rebound or guarding; +BSx4  Back: no vertebral point tenderness, no CVAT B/L  Extremities: WWP, no clubbing or cyanosis; no peripheral edema  Musculoskeletal: NROM x4; no joint swelling, tenderness or erythema  Vascular: peripheral pulses present   Dermatologic: skin warm, dry and intact; no rashes, wounds, or scars  Neurologic: AAOx3; CNII-XII grossly intact; no focal deficits

## 2021-08-11 NOTE — ED ADULT NURSE NOTE - OBJECTIVE STATEMENT
Patient alert and oriented x 3 came c/o worsening rt ear pain for 4 days , patient reported went for swimming over the weekend , started having rt ear pain with greenish discharge and nausea  . Pt. was seen at  yesterday was sent here prescribed with meds with no relief . Pt. reported having chills last night .  Denies any vomiting . Seen and examined by ROMY Jesus , all labs sent , medicated as ordered ., Took percocet at 1pm with no relief . Awaiting for Ent consult .

## 2021-08-11 NOTE — ED PROVIDER NOTE - PHYSICAL EXAMINATION
Vitals reviewed  Gen: appears uncomfortable but nad, speaking in full sentences  Skin: wwp, no rash/lesions  HEENT: ncat, eomi, mmm; right ear w/ swelling of ear canal, unable to visualize TM secondary to pain and swelling of canal, yellow discharge noted, no bleeding, tenderness w/ movement of pinna; tenderness over mastoid and TMJ joint, erythema over both mastoid and TMJ; left TM and canal clear; no maxilla or mandibular swelling, minimal trismus; uvula midline, no tonsilar exudates  Neck w/ right cervical and submandibular lymphadenopathy, supple, no meningeal signs  CV: rrr, no audible m/r/g  Resp: symmetrical expansion, ctab, no w/r/r  Ext: FROM throughout, no peripheral edema  Neuro: alert/oriented, no focal deficits, steady gait

## 2021-08-11 NOTE — ED PROVIDER NOTE - PROGRESS NOTE DETAILS
ENT eval pt, recommend no surgical intervention.  will admit to medicine for IV abx and pain control for necrotizing otitis externa and concern for mastoid extension

## 2021-08-11 NOTE — ED PROVIDER NOTE - CLINICAL SUMMARY MEDICAL DECISION MAKING FREE TEXT BOX
32 y/o F w/ PMHx bipolar disorder, endometriosis presents today referred by urgent care for 4 days of right ear pain/HA.  pt seen in ED yesterday and had labs and CT which shows concern for necrotizing otitis externa.  eval by ENT, ear wick placed and dc with cipro otic and PO, and percocet for pain.  pt with worsening sxs on meds, now w/ extension of erythema over mastoid and mild trismus.  labs resent w/ lactate.  ENT c/s and will give broad IV abx as pt will require admission

## 2021-08-11 NOTE — H&P ADULT - PROBLEM SELECTOR PLAN 3
- continue home medication, Lamictal 300mg daily     #Depression   - continue Lexapro 20mg daily - diagnosed on colonoscopy 3 months ago   - home medication: Apriso (mesalamine), pt does not know home dose- confirm home dose in AM and start

## 2021-08-11 NOTE — H&P ADULT - PROBLEM SELECTOR PLAN 6
F: s/p 1 L bolus   E: Replete as needed  N: soft for jaw pain   DVT ppx: non indicated     Dispo: [facility]

## 2021-08-11 NOTE — H&P ADULT - ATTENDING COMMENTS
#Sepsis: 2/2 OE, possible necrotizing. ENT following, no surgical intervention per attending. c/w Vanc/zosyn, ciprodex, f/up blood cultures. If worsening sepsis, urgent surgical evaluation

## 2021-08-11 NOTE — H&P ADULT - PROBLEM SELECTOR PLAN 1
- CT scan indicative of early necrotizing otitis externa  - ENT following    Plan   - continue Vancomycin and Zosyn for broad spectrum Abx coverage   - f/u ENT recs, no acute surgical intervention at this time   - start Ciprodex ear drops (ciprofloxacin and dexamethasone ear drops)   - f/u blood cx 2/2 necrotizing otitis externa   - s/p 1 L bolus     Plan   - give additional 1L bolus  - continue with vanc/zosyn   - f/u blood cx

## 2021-08-12 ENCOUNTER — APPOINTMENT (OUTPATIENT)
Dept: OTOLARYNGOLOGY | Facility: CLINIC | Age: 31
End: 2021-08-12

## 2021-08-12 LAB
ANION GAP SERPL CALC-SCNC: 8 MMOL/L — SIGNIFICANT CHANGE UP (ref 5–17)
BUN SERPL-MCNC: 6 MG/DL — LOW (ref 7–23)
CALCIUM SERPL-MCNC: 9.2 MG/DL — SIGNIFICANT CHANGE UP (ref 8.4–10.5)
CHLORIDE SERPL-SCNC: 103 MMOL/L — SIGNIFICANT CHANGE UP (ref 96–108)
CO2 SERPL-SCNC: 28 MMOL/L — SIGNIFICANT CHANGE UP (ref 22–31)
COVID-19 SPIKE DOMAIN AB INTERP: POSITIVE
COVID-19 SPIKE DOMAIN ANTIBODY RESULT: >250 U/ML — HIGH
CREAT SERPL-MCNC: 0.62 MG/DL — SIGNIFICANT CHANGE UP (ref 0.5–1.3)
GLUCOSE SERPL-MCNC: 134 MG/DL — HIGH (ref 70–99)
GRAM STN FLD: SIGNIFICANT CHANGE UP
HCT VFR BLD CALC: 35.5 % — SIGNIFICANT CHANGE UP (ref 34.5–45)
HGB BLD-MCNC: 11.2 G/DL — LOW (ref 11.5–15.5)
MAGNESIUM SERPL-MCNC: 2 MG/DL — SIGNIFICANT CHANGE UP (ref 1.6–2.6)
MCHC RBC-ENTMCNC: 28 PG — SIGNIFICANT CHANGE UP (ref 27–34)
MCHC RBC-ENTMCNC: 31.5 GM/DL — LOW (ref 32–36)
MCV RBC AUTO: 88.8 FL — SIGNIFICANT CHANGE UP (ref 80–100)
NRBC # BLD: 0 /100 WBCS — SIGNIFICANT CHANGE UP (ref 0–0)
PLATELET # BLD AUTO: 253 K/UL — SIGNIFICANT CHANGE UP (ref 150–400)
POTASSIUM SERPL-MCNC: 3.7 MMOL/L — SIGNIFICANT CHANGE UP (ref 3.5–5.3)
POTASSIUM SERPL-SCNC: 3.7 MMOL/L — SIGNIFICANT CHANGE UP (ref 3.5–5.3)
RBC # BLD: 4 M/UL — SIGNIFICANT CHANGE UP (ref 3.8–5.2)
RBC # FLD: 13.8 % — SIGNIFICANT CHANGE UP (ref 10.3–14.5)
SARS-COV-2 IGG+IGM SERPL QL IA: >250 U/ML — HIGH
SARS-COV-2 IGG+IGM SERPL QL IA: POSITIVE
SODIUM SERPL-SCNC: 139 MMOL/L — SIGNIFICANT CHANGE UP (ref 135–145)
SPECIMEN SOURCE: SIGNIFICANT CHANGE UP
WBC # BLD: 8.3 K/UL — SIGNIFICANT CHANGE UP (ref 3.8–10.5)
WBC # FLD AUTO: 8.3 K/UL — SIGNIFICANT CHANGE UP (ref 3.8–10.5)

## 2021-08-12 PROCEDURE — 99233 SBSQ HOSP IP/OBS HIGH 50: CPT | Mod: GC

## 2021-08-12 PROCEDURE — 93010 ELECTROCARDIOGRAM REPORT: CPT

## 2021-08-12 RX ORDER — MESALAMINE 400 MG
375 TABLET, DELAYED RELEASE (ENTERIC COATED) ORAL
Qty: 0 | Refills: 0 | DISCHARGE

## 2021-08-12 RX ORDER — SODIUM CHLORIDE 9 MG/ML
500 INJECTION INTRAMUSCULAR; INTRAVENOUS; SUBCUTANEOUS ONCE
Refills: 0 | Status: COMPLETED | OUTPATIENT
Start: 2021-08-12 | End: 2021-08-12

## 2021-08-12 RX ORDER — MESALAMINE 400 MG
400 TABLET, DELAYED RELEASE (ENTERIC COATED) ORAL DAILY
Refills: 0 | Status: DISCONTINUED | OUTPATIENT
Start: 2021-08-12 | End: 2021-08-16

## 2021-08-12 RX ORDER — POTASSIUM CHLORIDE 20 MEQ
40 PACKET (EA) ORAL ONCE
Refills: 0 | Status: COMPLETED | OUTPATIENT
Start: 2021-08-12 | End: 2021-08-12

## 2021-08-12 RX ORDER — KETOROLAC TROMETHAMINE 30 MG/ML
15 SYRINGE (ML) INJECTION ONCE
Refills: 0 | Status: DISCONTINUED | OUTPATIENT
Start: 2021-08-12 | End: 2021-08-12

## 2021-08-12 RX ORDER — MESALAMINE 400 MG
0.38 TABLET, DELAYED RELEASE (ENTERIC COATED) ORAL
Qty: 0 | Refills: 0 | DISCHARGE

## 2021-08-12 RX ORDER — MESALAMINE 400 MG
0 TABLET, DELAYED RELEASE (ENTERIC COATED) ORAL
Qty: 0 | Refills: 0 | DISCHARGE

## 2021-08-12 RX ORDER — KETOROLAC TROMETHAMINE 30 MG/ML
15 SYRINGE (ML) INJECTION EVERY 6 HOURS
Refills: 0 | Status: DISCONTINUED | OUTPATIENT
Start: 2021-08-12 | End: 2021-08-13

## 2021-08-12 RX ADMIN — Medication 25 MICROGRAM(S): at 05:58

## 2021-08-12 RX ADMIN — LAMOTRIGINE 300 MILLIGRAM(S): 25 TABLET, ORALLY DISINTEGRATING ORAL at 11:44

## 2021-08-12 RX ADMIN — Medication 650 MILLIGRAM(S): at 14:19

## 2021-08-12 RX ADMIN — Medication 15 MILLIGRAM(S): at 05:54

## 2021-08-12 RX ADMIN — Medication 15 MILLIGRAM(S): at 11:58

## 2021-08-12 RX ADMIN — SODIUM CHLORIDE 500 MILLILITER(S): 9 INJECTION INTRAMUSCULAR; INTRAVENOUS; SUBCUTANEOUS at 14:47

## 2021-08-12 RX ADMIN — Medication 650 MILLIGRAM(S): at 02:42

## 2021-08-12 RX ADMIN — PIPERACILLIN AND TAZOBACTAM 200 GRAM(S): 4; .5 INJECTION, POWDER, LYOPHILIZED, FOR SOLUTION INTRAVENOUS at 19:19

## 2021-08-12 RX ADMIN — PIPERACILLIN AND TAZOBACTAM 200 GRAM(S): 4; .5 INJECTION, POWDER, LYOPHILIZED, FOR SOLUTION INTRAVENOUS at 01:39

## 2021-08-12 RX ADMIN — Medication 15 MILLIGRAM(S): at 21:59

## 2021-08-12 RX ADMIN — Medication 15 MILLIGRAM(S): at 16:14

## 2021-08-12 RX ADMIN — Medication 15 MILLIGRAM(S): at 15:59

## 2021-08-12 RX ADMIN — Medication 650 MILLIGRAM(S): at 20:16

## 2021-08-12 RX ADMIN — ESCITALOPRAM OXALATE 20 MILLIGRAM(S): 10 TABLET, FILM COATED ORAL at 11:44

## 2021-08-12 RX ADMIN — PIPERACILLIN AND TAZOBACTAM 200 GRAM(S): 4; .5 INJECTION, POWDER, LYOPHILIZED, FOR SOLUTION INTRAVENOUS at 07:01

## 2021-08-12 RX ADMIN — Medication 650 MILLIGRAM(S): at 19:50

## 2021-08-12 RX ADMIN — Medication 4 DROP(S): at 05:59

## 2021-08-12 RX ADMIN — Medication 400 MILLIGRAM(S): at 17:30

## 2021-08-12 RX ADMIN — Medication 15 MILLIGRAM(S): at 06:00

## 2021-08-12 RX ADMIN — PIPERACILLIN AND TAZOBACTAM 200 GRAM(S): 4; .5 INJECTION, POWDER, LYOPHILIZED, FOR SOLUTION INTRAVENOUS at 13:10

## 2021-08-12 RX ADMIN — Medication 15 MILLIGRAM(S): at 11:43

## 2021-08-12 RX ADMIN — Medication 650 MILLIGRAM(S): at 03:12

## 2021-08-12 RX ADMIN — Medication 15 MILLIGRAM(S): at 22:30

## 2021-08-12 RX ADMIN — Medication 40 MILLIEQUIVALENT(S): at 13:12

## 2021-08-12 RX ADMIN — Medication 650 MILLIGRAM(S): at 13:49

## 2021-08-12 NOTE — PROGRESS NOTE ADULT - ASSESSMENT
A/P:  31y Female seen in [facility] ED yesterday, 8/10, with severe right OE and s/p R ear wick placement presenting with uncontrolled pain and inability to tolerate PO foods. Ear wick in place. Mastoid erythema and tenderness worsened 8/11 with improvement this AM.    Plan:  Recommend vancomycin/zosyn  Recommend c/w ciprodex drops to R ear BID  Please remove R ear cartilage piercing  Pain control per primary team  No acute ENT intervention  ENT will continue to follow  Please page ENT with any concerns/questions

## 2021-08-12 NOTE — PROGRESS NOTE ADULT - PROBLEM SELECTOR PLAN 2
Diagnosed on colonoscopy 3 months ago though undergoing further rheum w/u. On Apriso 375mg qd at home  -delzicol 400mg qd while inpatient Diagnosed on colonoscopy 3 months ago though undergoing further rheum w/u for increased inflammatory markers. On Apriso 375mg qd at home  -delzicol 400mg qd while inpatient

## 2021-08-12 NOTE — PROGRESS NOTE ADULT - SUBJECTIVE AND OBJECTIVE BOX
Internal Medicine Progress Note  Amalia Nia, PGY-1  Pager: [phone]    ******INCOMPLETE******    Patient is a 31y old  Female who presents with a chief complaint of   OVERNIGHT EVENTS/INTERVAL HPI:    OBJECTIVE:  Vital Signs Last 24 Hrs  T(C): 36.8 (12 Aug 2021 08:05), Max: 38.5 (11 Aug 2021 17:24)  T(F): 98.3 (12 Aug 2021 08:05), Max: 101.3 (11 Aug 2021 17:24)  HR: 70 (12 Aug 2021 08:05) (70 - 97)  BP: 111/71 (12 Aug 2021 08:05) (111/71 - 143/84)  BP(mean): --  RR: 17 (12 Aug 2021 08:05) (17 - 18)  SpO2: 95% (12 Aug 2021 08:05) (94% - 100%)  I&O's Detail    11 Aug 2021 07:01  -  12 Aug 2021 07:00  --------------------------------------------------------  IN:    IV PiggyBack: 200 mL  Total IN: 200 mL    OUT:    Voided (mL): 1750 mL  Total OUT: 1750 mL    Total NET: -1550 mL      12 Aug 2021 07:01  -  12 Aug 2021 11:08  --------------------------------------------------------  IN:    Oral Fluid: 680 mL  Total IN: 680 mL    OUT:    Voided (mL): 0 mL  Total OUT: 0 mL    Total NET: 680 mL        Physical Exam:  GENERAL: Awake, alert and interactive, no acute distress, appears comfortable  NEURO: A&Ox4, no focal deficits, 5/5 strength in all ext, reflexes 2+ throughout  HEENT: Normocephalic, atraumatic, CN2-12 intact, no conjunctivitis or scleral icterus, oral mucosa moist, no oral lesions noted  NECK: Supple, no JVD, no LAD, thyroid not palpable  CARDIAC: Regular rate and rhythm, +S1/S2, no murmurs/rubs/gallops  PULM: Breathing comfortably on RA, clear to auscultation bilaterally, no wheezes/rales/rhonchi, no inspiratory stridor  ABDOMEN: Soft, nontender, nondistended, +bs, no hepatosplenomegaly, no rebound tenderness or fluid wave, no CVA tenderness  : Deferred  MSK: Range of motion grossly intact, no back tenderness  SKIN: Warm and dry, no rashes, no lesions  VASC: 2+ peripheral pulses, no edema  Psych: Appropriate affect    Medications:  MEDICATIONS  (STANDING):  dexAMETHasone 0.1% Ophthalmic Solution for OTIC Use 4 Drop(s) Right Ear two times a day  escitalopram 20 milliGRAM(s) Oral daily  lamoTRIgine 300 milliGRAM(s) Oral daily  levothyroxine 25 MICROGram(s) Oral daily  piperacillin/tazobactam IVPB.. 4.5 Gram(s) IV Intermittent every 6 hours  sodium chloride 0.9% Bolus 1000 milliLiter(s) IV Bolus once    MEDICATIONS  (PRN):  acetaminophen   Tablet .. 650 milliGRAM(s) Oral every 6 hours PRN Temp greater or equal to 38C (100.4F), Mild Pain (1 - 3)  ketorolac   Injectable 15 milliGRAM(s) IV Push every 6 hours PRN Moderate Pain (4 - 6)      Labs:                        11.2   8.30  )-----------( 253      ( 12 Aug 2021 08:20 )             35.5     08-12    139  |  103  |  6<L>  ----------------------------<  134<H>  3.7   |  28  |  0.62    Ca    9.2      12 Aug 2021 08:20  Mg     2.0     08-12    TPro  7.4  /  Alb  4.3  /  TBili  0.3  /  DBili  x   /  AST  14  /  ALT  14  /  AlkPhos  71  08-11        COVID-19 PCR: NotDetec (11 Aug 2021 15:48)      Radiology: Reviewed Internal Medicine Progress Note  Amalia Kramer, PGY-1  Pager: [phone]    Hospital Course:  Patient is a 30 y/o F with PMH of bipolar disorder, endometriosis, and ulcerative colitis who presented 8/11 for worsening right ear pain and swelling x4d being treated for sepsis 2/2 necrotizing otitis externa. Ear wick was placed 8/10 during prior ED visit. Initially started on vanc/zosyn and cipro and dexamethasone ear drops. ENT following.    OVERNIGHT EVENTS/INTERVAL HPI: Toradol 1x for pain o/n. This morning, patient states pain is much better (1/10 from 8/10) with the toradol. Asking not to be given opioids as they haven't been helpful. C/o dizziness when moves head as well as inability to open jaw all the way 2/2 pain. Vomited 1x in the ED after eating last night for the first time in 2d, but no current nausea. Not c/o HA, CP, SOB, diarrhea, or dysuria.    OBJECTIVE:  Vital Signs Last 24 Hrs  T(C): 36.8 (12 Aug 2021 08:05), Max: 38.5 (11 Aug 2021 17:24)  T(F): 98.3 (12 Aug 2021 08:05), Max: 101.3 (11 Aug 2021 17:24)  HR: 70 (12 Aug 2021 08:05) (70 - 97)  BP: 111/71 (12 Aug 2021 08:05) (111/71 - 143/84)  BP(mean): --  RR: 17 (12 Aug 2021 08:05) (17 - 18)  SpO2: 95% (12 Aug 2021 08:05) (94% - 100%)  I&O's Detail    11 Aug 2021 07:01  -  12 Aug 2021 07:00  --------------------------------------------------------  IN:    IV PiggyBack: 200 mL  Total IN: 200 mL    OUT:    Voided (mL): 1750 mL  Total OUT: 1750 mL    Total NET: -1550 mL      12 Aug 2021 07:01  -  12 Aug 2021 11:08  --------------------------------------------------------  IN:    Oral Fluid: 680 mL  Total IN: 680 mL    OUT:    Voided (mL): 0 mL  Total OUT: 0 mL    Total NET: 680 mL    Physical Exam:  GENERAL: Awake, alert and interactive, no acute distress, appears comfortable  NEURO: A&Ox4, no focal deficits  HEENT: Purulent drainage from R ear. R ear and maxillary area exquisitely tender though not swollen or erythematous. EOMI, no conjunctivitis or scleral icterus, oral mucosa moist, no oral lesions noted though limited jaw movement  NECK: Supple, no JVD  CARDIAC: Regular rate and rhythm, +S1/S2, no murmurs/rubs/gallops  PULM: Breathing comfortably on RA, clear to auscultation bilaterally, no wheezes/rales/rhonchi  ABDOMEN: Obese, soft, nontender, nondistended, +bs  : Deferred  MSK: Range of motion grossly intact  SKIN: Warm and dry  VASC: 2+ peripheral pulses, no edema  Psych: Appropriate affect    Medications:  MEDICATIONS  (STANDING):  dexAMETHasone 0.1% Ophthalmic Solution for OTIC Use 4 Drop(s) Right Ear two times a day  escitalopram 20 milliGRAM(s) Oral daily  lamoTRIgine 300 milliGRAM(s) Oral daily  levothyroxine 25 MICROGram(s) Oral daily  piperacillin/tazobactam IVPB.. 4.5 Gram(s) IV Intermittent every 6 hours  sodium chloride 0.9% Bolus 1000 milliLiter(s) IV Bolus once    MEDICATIONS  (PRN):  acetaminophen   Tablet .. 650 milliGRAM(s) Oral every 6 hours PRN Temp greater or equal to 38C (100.4F), Mild Pain (1 - 3)  ketorolac   Injectable 15 milliGRAM(s) IV Push every 6 hours PRN Moderate Pain (4 - 6)      Labs:                        11.2   8.30  )-----------( 253      ( 12 Aug 2021 08:20 )             35.5     08-12    139  |  103  |  6<L>  ----------------------------<  134<H>  3.7   |  28  |  0.62    Ca    9.2      12 Aug 2021 08:20  Mg     2.0     08-12    TPro  7.4  /  Alb  4.3  /  TBili  0.3  /  DBili  x   /  AST  14  /  ALT  14  /  AlkPhos  71  08-11        COVID-19 PCR: NotDetec (11 Aug 2021 15:48)      Radiology: Reviewed

## 2021-08-12 NOTE — PROGRESS NOTE ADULT - PROBLEM SELECTOR PLAN 1
No longer meeting sepsis criteria. CT scan indicative of early necrotizing otitis externa w/o c/f OM.    - d/c Vancomycin   - c/w and Zosyn for pseudomonal coverage  - f/u R ear culture. If sensitive to cipro, can switch to PO when ESR/CRP downtrend  - f/u ENT recs, no acute surgical intervention at this time   - d/c Ciprodex ear drops (ciprofloxacin and dexamethasone ear drops)   - f/u blood cx  - toradol 15mg IV q6hr prn pain No longer meeting sepsis criteria. CT scan indicative of early necrotizing otitis externa w/o c/f OM.    - d/c Vancomycin   - c/w and Zosyn for pseudomonal coverage  - f/u R ear culture. If sensitive to cipro, can switch to PO when ESR/CRP downtrend  - f/u ENT recs, no acute surgical intervention at this time   - d/c Ciprodex ear drops (ciprofloxacin and dexamethasone ear drops)   - f/u blood cx  - toradol 15mg IV q6hr prn mod pain, tylenol 650mg prn mild pain/fever No longer meeting sepsis criteria. CT scan indicative of early necrotizing otitis externa w/o c/f OM.    - d/c Vancomycin   - c/w and Zosyn for pseudomonal coverage  - f/u R ear culture. If sensitive to cipro, can switch to PO when ESR/CRP downtrend  - f/u ENT recs, no acute surgical intervention at this time   - d/c Ciprodex ear drops   - c/w dexamethasone ear drops  - f/u blood cx  - toradol 15mg IV q6hr prn mod pain, tylenol 650mg prn mild pain/fever No longer meeting sepsis criteria. CT scan indicative of early necrotizing otitis externa w/o c/f OM.    - d/c Vancomycin   - c/w and Zosyn for pseudomonal coverage  - f/u R ear culture. If sensitive to cipro, can switch to PO when ESR/CRP downtrend  - f/u ENT recs, no acute surgical intervention at this time   - d/c Ciprodex ear drops   - c/w dexamethasone ear drops  - f/u blood cx  - toradol 15mg IV q6hr prn mod pain, tylenol 650mg prn mild pain/fever  -no need for bone scan as CT sufficent

## 2021-08-12 NOTE — PROGRESS NOTE ADULT - PROBLEM SELECTOR PLAN 5
F: s/p 1 L bolus   E: Replete as needed  N: soft for jaw pain   DVT ppx: non indicated   Code status: Full code  Dispo: pending clinical response F: None  E: Replete as needed  N: soft for jaw pain   DVT ppx: non indicated based on IMPROVE score  Code status: Full code  Dispo: pending clinical response

## 2021-08-12 NOTE — PROGRESS NOTE ADULT - SUBJECTIVE AND OBJECTIVE BOX
08-12 Patient seen at bedside and discussed with attending. no acute events overnight. improved erythema this AM. Patient continues to have piercing in R ear.     ALLERGIES  ketamine (Unknown)    MEDICATIONS  (STANDING):  ciprofloxacin  0.3% Otic Solution 4 Drop(s) Right Ear every 12 hours  dexAMETHasone 0.1% Ophthalmic Solution for OTIC Use 4 Drop(s) Right Ear two times a day  escitalopram 20 milliGRAM(s) Oral daily  lamoTRIgine 300 milliGRAM(s) Oral daily  levothyroxine 25 MICROGram(s) Oral daily  piperacillin/tazobactam IVPB.. 4.5 Gram(s) IV Intermittent every 6 hours  sodium chloride 0.9% Bolus 1000 milliLiter(s) IV Bolus once  vancomycin  IVPB      vancomycin  IVPB 2000 milliGRAM(s) IV Intermittent every 12 hours    MEDICATIONS  (PRN):  acetaminophen   Tablet .. 650 milliGRAM(s) Oral every 6 hours PRN Temp greater or equal to 38C (100.4F), Mild Pain (1 - 3)        LABS                         12.1   9.84  )-----------( 292      ( 11 Aug 2021 15:48 )             37.7    08-11    135  |  98  |  6<L>  ----------------------------<  138<H>  3.7   |  25  |  0.69    Ca    9.8      11 Aug 2021 15:48    TPro  7.4  /  Alb  4.3  /  TBili  0.3  /  DBili  x   /  AST  14  /  ALT  14  /  AlkPhos  71  08-11    LIVER FUNCTIONS - ( 11 Aug 2021 15:48 )  Alb: 4.3 g/dL / Pro: 7.4 g/dL / ALK PHOS: 71 U/L / ALT: 14 U/L / AST: 14 U/L / GGT: x               Culture - Blood (collected 08-11 @ 16:19)  Source: .Blood Blood-Peripheral  Preliminary Report (08-12 @ 05:00):    No growth at 12 hours    Culture - Blood (collected 08-11 @ 16:19)  Source: .Blood Blood-Peripheral  Preliminary Report (08-12 @ 05:00):    No growth at 12 hours          INs & OUTs  Drains:       VITAL SIGNS:  ICU Vital Signs Last 24 Hrs  T(C): 37.2 (12 Aug 2021 04:07), Max: 38.5 (11 Aug 2021 17:24)  T(F): 98.9 (12 Aug 2021 04:07), Max: 101.3 (11 Aug 2021 17:24)  HR: 80 (12 Aug 2021 04:07) (78 - 97)  BP: 140/80 (12 Aug 2021 04:07) (132/79 - 143/84)  BP(mean): --  ABP: --  ABP(mean): --  RR: 18 (12 Aug 2021 04:07) (18 - 18)  SpO2: 95% (12 Aug 2021 04:07) (94% - 100%)    PHYSICAL EXAM:  ENT EXAM-   Constitutional: Well-developed, well-nourished.  No hoarseness.     Head:  normocephalic, atraumatic.   AD: mastoid with much improved erythema, tender to palpation, no fluctuance, pinna erythematous, tender to palpation, no fluctuance, EAC with some purulent drainage, ear wick visible, earing to cartilage  AS: mastoid flat, non-tender, no erythema, pinna clear, no erythema, EAC clear, no lesions, TM flat, no erythema, no effusion  Nose:  Septum intact. Inferior turbinates normal bilateral  OC/OP:  Floor of mouth, buccal mucosa, lips, hard palate, soft palate, uvula, posterior pharyngeal wall normal.  Mucosa moist.  Neck:  Tenderness to palpation of R neck levels II-III

## 2021-08-12 NOTE — PROGRESS NOTE ADULT - ASSESSMENT
Patient is a 32 y/o F with PMH of bipolar disorder, endometriosis, and ulcerative colitis who presented with sepsis 2/2 R necrotizing otitis externa on Zosyn.

## 2021-08-13 LAB
ANION GAP SERPL CALC-SCNC: 8 MMOL/L — SIGNIFICANT CHANGE UP (ref 5–17)
BASOPHILS # BLD AUTO: 0.04 K/UL — SIGNIFICANT CHANGE UP (ref 0–0.2)
BASOPHILS NFR BLD AUTO: 0.5 % — SIGNIFICANT CHANGE UP (ref 0–2)
BUN SERPL-MCNC: 6 MG/DL — LOW (ref 7–23)
CALCIUM SERPL-MCNC: 9.5 MG/DL — SIGNIFICANT CHANGE UP (ref 8.4–10.5)
CHLORIDE SERPL-SCNC: 105 MMOL/L — SIGNIFICANT CHANGE UP (ref 96–108)
CO2 SERPL-SCNC: 26 MMOL/L — SIGNIFICANT CHANGE UP (ref 22–31)
CREAT SERPL-MCNC: 0.63 MG/DL — SIGNIFICANT CHANGE UP (ref 0.5–1.3)
CRP SERPL-MCNC: 133 MG/L — HIGH (ref 0–4)
EOSINOPHIL # BLD AUTO: 0.14 K/UL — SIGNIFICANT CHANGE UP (ref 0–0.5)
EOSINOPHIL NFR BLD AUTO: 1.9 % — SIGNIFICANT CHANGE UP (ref 0–6)
ERYTHROCYTE [SEDIMENTATION RATE] IN BLOOD: 93 MM/HR — HIGH
GLUCOSE SERPL-MCNC: 113 MG/DL — HIGH (ref 70–99)
GRAM STN FLD: SIGNIFICANT CHANGE UP
HCT VFR BLD CALC: 34.5 % — SIGNIFICANT CHANGE UP (ref 34.5–45)
HGB BLD-MCNC: 11 G/DL — LOW (ref 11.5–15.5)
IMM GRANULOCYTES NFR BLD AUTO: 0.3 % — SIGNIFICANT CHANGE UP (ref 0–1.5)
LYMPHOCYTES # BLD AUTO: 1.57 K/UL — SIGNIFICANT CHANGE UP (ref 1–3.3)
LYMPHOCYTES # BLD AUTO: 21 % — SIGNIFICANT CHANGE UP (ref 13–44)
MCHC RBC-ENTMCNC: 28.6 PG — SIGNIFICANT CHANGE UP (ref 27–34)
MCHC RBC-ENTMCNC: 31.9 GM/DL — LOW (ref 32–36)
MCV RBC AUTO: 89.6 FL — SIGNIFICANT CHANGE UP (ref 80–100)
MONOCYTES # BLD AUTO: 0.55 K/UL — SIGNIFICANT CHANGE UP (ref 0–0.9)
MONOCYTES NFR BLD AUTO: 7.4 % — SIGNIFICANT CHANGE UP (ref 2–14)
NEUTROPHILS # BLD AUTO: 5.15 K/UL — SIGNIFICANT CHANGE UP (ref 1.8–7.4)
NEUTROPHILS NFR BLD AUTO: 68.9 % — SIGNIFICANT CHANGE UP (ref 43–77)
NRBC # BLD: 0 /100 WBCS — SIGNIFICANT CHANGE UP (ref 0–0)
PLATELET # BLD AUTO: 274 K/UL — SIGNIFICANT CHANGE UP (ref 150–400)
POTASSIUM SERPL-MCNC: 4.2 MMOL/L — SIGNIFICANT CHANGE UP (ref 3.5–5.3)
POTASSIUM SERPL-SCNC: 4.2 MMOL/L — SIGNIFICANT CHANGE UP (ref 3.5–5.3)
RBC # BLD: 3.85 M/UL — SIGNIFICANT CHANGE UP (ref 3.8–5.2)
RBC # FLD: 13.9 % — SIGNIFICANT CHANGE UP (ref 10.3–14.5)
SODIUM SERPL-SCNC: 139 MMOL/L — SIGNIFICANT CHANGE UP (ref 135–145)
SPECIMEN SOURCE: SIGNIFICANT CHANGE UP
WBC # BLD: 7.47 K/UL — SIGNIFICANT CHANGE UP (ref 3.8–10.5)
WBC # FLD AUTO: 7.47 K/UL — SIGNIFICANT CHANGE UP (ref 3.8–10.5)

## 2021-08-13 PROCEDURE — 99233 SBSQ HOSP IP/OBS HIGH 50: CPT | Mod: GC

## 2021-08-13 RX ORDER — ACETAMINOPHEN 500 MG
975 TABLET ORAL EVERY 8 HOURS
Refills: 0 | Status: DISCONTINUED | OUTPATIENT
Start: 2021-08-13 | End: 2021-08-16

## 2021-08-13 RX ORDER — CIPROFLOXACIN HCL 0.3 %
4 DROPS OPHTHALMIC (EYE) EVERY 12 HOURS
Refills: 0 | Status: DISCONTINUED | OUTPATIENT
Start: 2021-08-13 | End: 2021-08-14

## 2021-08-13 RX ORDER — TRAMADOL HYDROCHLORIDE 50 MG/1
25 TABLET ORAL ONCE
Refills: 0 | Status: DISCONTINUED | OUTPATIENT
Start: 2021-08-13 | End: 2021-08-13

## 2021-08-13 RX ORDER — SODIUM CHLORIDE 9 MG/ML
1000 INJECTION INTRAMUSCULAR; INTRAVENOUS; SUBCUTANEOUS
Refills: 0 | Status: COMPLETED | OUTPATIENT
Start: 2021-08-13 | End: 2021-08-14

## 2021-08-13 RX ORDER — ACETAMINOPHEN 500 MG
650 TABLET ORAL ONCE
Refills: 0 | Status: COMPLETED | OUTPATIENT
Start: 2021-08-13 | End: 2021-08-13

## 2021-08-13 RX ORDER — ONDANSETRON 8 MG/1
4 TABLET, FILM COATED ORAL ONCE
Refills: 0 | Status: DISCONTINUED | OUTPATIENT
Start: 2021-08-13 | End: 2021-08-13

## 2021-08-13 RX ORDER — KETOROLAC TROMETHAMINE 30 MG/ML
15 SYRINGE (ML) INJECTION EVERY 4 HOURS
Refills: 0 | Status: DISCONTINUED | OUTPATIENT
Start: 2021-08-13 | End: 2021-08-13

## 2021-08-13 RX ORDER — KETOROLAC TROMETHAMINE 30 MG/ML
15 SYRINGE (ML) INJECTION EVERY 6 HOURS
Refills: 0 | Status: DISCONTINUED | OUTPATIENT
Start: 2021-08-13 | End: 2021-08-16

## 2021-08-13 RX ORDER — KETOROLAC TROMETHAMINE 30 MG/ML
15 SYRINGE (ML) INJECTION EVERY 6 HOURS
Refills: 0 | Status: DISCONTINUED | OUTPATIENT
Start: 2021-08-13 | End: 2021-08-15

## 2021-08-13 RX ADMIN — Medication 15 MILLIGRAM(S): at 14:37

## 2021-08-13 RX ADMIN — Medication 25 MICROGRAM(S): at 06:05

## 2021-08-13 RX ADMIN — ESCITALOPRAM OXALATE 20 MILLIGRAM(S): 10 TABLET, FILM COATED ORAL at 12:45

## 2021-08-13 RX ADMIN — PIPERACILLIN AND TAZOBACTAM 200 GRAM(S): 4; .5 INJECTION, POWDER, LYOPHILIZED, FOR SOLUTION INTRAVENOUS at 18:38

## 2021-08-13 RX ADMIN — Medication 975 MILLIGRAM(S): at 14:37

## 2021-08-13 RX ADMIN — TRAMADOL HYDROCHLORIDE 25 MILLIGRAM(S): 50 TABLET ORAL at 12:47

## 2021-08-13 RX ADMIN — Medication 650 MILLIGRAM(S): at 01:15

## 2021-08-13 RX ADMIN — Medication 400 MILLIGRAM(S): at 06:05

## 2021-08-13 RX ADMIN — PIPERACILLIN AND TAZOBACTAM 200 GRAM(S): 4; .5 INJECTION, POWDER, LYOPHILIZED, FOR SOLUTION INTRAVENOUS at 23:51

## 2021-08-13 RX ADMIN — TRAMADOL HYDROCHLORIDE 25 MILLIGRAM(S): 50 TABLET ORAL at 13:15

## 2021-08-13 RX ADMIN — Medication 15 MILLIGRAM(S): at 20:48

## 2021-08-13 RX ADMIN — Medication 4 DROP(S): at 18:00

## 2021-08-13 RX ADMIN — PIPERACILLIN AND TAZOBACTAM 200 GRAM(S): 4; .5 INJECTION, POWDER, LYOPHILIZED, FOR SOLUTION INTRAVENOUS at 06:05

## 2021-08-13 RX ADMIN — Medication 650 MILLIGRAM(S): at 07:15

## 2021-08-13 RX ADMIN — Medication 15 MILLIGRAM(S): at 04:22

## 2021-08-13 RX ADMIN — Medication 975 MILLIGRAM(S): at 22:20

## 2021-08-13 RX ADMIN — Medication 975 MILLIGRAM(S): at 22:06

## 2021-08-13 RX ADMIN — Medication 650 MILLIGRAM(S): at 00:46

## 2021-08-13 RX ADMIN — PIPERACILLIN AND TAZOBACTAM 200 GRAM(S): 4; .5 INJECTION, POWDER, LYOPHILIZED, FOR SOLUTION INTRAVENOUS at 00:25

## 2021-08-13 RX ADMIN — Medication 15 MILLIGRAM(S): at 21:15

## 2021-08-13 RX ADMIN — Medication 15 MILLIGRAM(S): at 18:00

## 2021-08-13 RX ADMIN — Medication 15 MILLIGRAM(S): at 15:10

## 2021-08-13 RX ADMIN — Medication 650 MILLIGRAM(S): at 07:40

## 2021-08-13 RX ADMIN — PIPERACILLIN AND TAZOBACTAM 200 GRAM(S): 4; .5 INJECTION, POWDER, LYOPHILIZED, FOR SOLUTION INTRAVENOUS at 12:47

## 2021-08-13 RX ADMIN — Medication 15 MILLIGRAM(S): at 08:08

## 2021-08-13 RX ADMIN — Medication 975 MILLIGRAM(S): at 15:10

## 2021-08-13 RX ADMIN — Medication 15 MILLIGRAM(S): at 08:24

## 2021-08-13 RX ADMIN — LAMOTRIGINE 300 MILLIGRAM(S): 25 TABLET, ORALLY DISINTEGRATING ORAL at 12:45

## 2021-08-13 RX ADMIN — Medication 15 MILLIGRAM(S): at 17:33

## 2021-08-13 NOTE — PROGRESS NOTE ADULT - PROBLEM SELECTOR PLAN 1
No longer meeting sepsis criteria. CT scan indicative of early necrotizing otitis externa w/o c/f OM.    - d/c Vancomycin   - c/w and Zosyn for pseudomonal coverage  - f/u R ear culture. If sensitive to cipro, can switch to PO when ESR/CRP downtrend  - f/u ENT recs, no acute surgical intervention at this time   - d/c Ciprodex ear drops   - c/w dexamethasone ear drops  - f/u blood cx  - toradol 15mg IV q6hr prn mod pain, tylenol 650mg prn mild pain/fever  -no need for bone scan as CT sufficent No longer meeting sepsis criteria. CT scan indicative of early necrotizing otitis externa w/o c/f OM.    - c/w Zosyn for pseudomonal coverage  - f/u R ear culture. If sensitive to cipro, can switch to PO when ESR/CRP downtrend  - f/u ENT recs, no acute surgical intervention at this time   - no need for bone scan as CT sufficient  - c/w dexamethasone ear drops  - f/u blood cx  - toradol 15mg IV q6hr prn mod pain, toradol 15mg IV q6hr for prn breakthrough pain, tylenol 975mg [id number] prn mild pain/fever No longer meeting sepsis criteria. CT scan indicative of early necrotizing otitis externa w/o c/f OM.  - c/w Zosyn for pseudomonal coverage  - f/u R ear culture. If sensitive to cipro, can switch to PO when ESR/CRP downtrend  - c/w dexamethasone ear drops  - f/u ENT recs, no acute surgical intervention at this time   - f/u blood cx from 8/11 - NGTD  - new pain regimen: toradol 15mg IV q6hr prn mod pain, toradol 15mg IV q6hr for prn breakthrough pain, tylenol 975mg [id number] prn mild pain/fever

## 2021-08-13 NOTE — PROGRESS NOTE ADULT - SUBJECTIVE AND OBJECTIVE BOX
Internal Medicine Progress Note  Amalia Nia, PGY-1  Pager: [phone]    ******INCOMPLETE******    Patient is a 31y old  Female who presents with a chief complaint of necrotizing otitis externa (12 Aug 2021 11:08)    OVERNIGHT EVENTS/INTERVAL HPI:    OBJECTIVE:  Vital Signs Last 24 Hrs  T(C): 37 (13 Aug 2021 05:00), Max: 37.5 (12 Aug 2021 14:03)  T(F): 98.6 (13 Aug 2021 05:00), Max: 99.5 (12 Aug 2021 14:03)  HR: 70 (13 Aug 2021 05:00) (70 - 81)  BP: 112/64 (13 Aug 2021 05:00) (100/65 - 124/79)  BP(mean): 80 (13 Aug 2021 05:00) (80 - 80)  RR: 17 (13 Aug 2021 05:00) (16 - 17)  SpO2: 97% (13 Aug 2021 05:00) (95% - 98%)  I&O's Detail    11 Aug 2021 07:01  -  12 Aug 2021 07:00  --------------------------------------------------------  IN:    IV PiggyBack: 200 mL  Total IN: 200 mL    OUT:    Voided (mL): 1750 mL  Total OUT: 1750 mL    Total NET: -1550 mL      12 Aug 2021 07:01  -  13 Aug 2021 06:20  --------------------------------------------------------  IN:    IV PiggyBack: 200 mL    Oral Fluid: 1280 mL    Sodium Chloride 0.9% Bolus: 500 mL  Total IN: 1980 mL    OUT:    Voided (mL): 1450 mL  Total OUT: 1450 mL    Total NET: 530 mL        Physical Exam:  GENERAL: Awake, alert and interactive, no acute distress, appears comfortable  NEURO: A&Ox4, no focal deficits, 5/5 strength in all ext, reflexes 2+ throughout  HEENT: Normocephalic, atraumatic, CN2-12 intact, no conjunctivitis or scleral icterus, oral mucosa moist, no oral lesions noted  NECK: Supple, no JVD, no LAD, thyroid not palpable  CARDIAC: Regular rate and rhythm, +S1/S2, no murmurs/rubs/gallops  PULM: Breathing comfortably on RA, clear to auscultation bilaterally, no wheezes/rales/rhonchi, no inspiratory stridor  ABDOMEN: Soft, nontender, nondistended, +bs, no hepatosplenomegaly, no rebound tenderness or fluid wave, no CVA tenderness  : Deferred  MSK: Range of motion grossly intact, no back tenderness  SKIN: Warm and dry, no rashes, no lesions  VASC: 2+ peripheral pulses, no edema  Psych: Appropriate affect    Medications:  MEDICATIONS  (STANDING):  dexAMETHasone 0.1% Ophthalmic Solution for OTIC Use 4 Drop(s) Right Ear two times a day  escitalopram 20 milliGRAM(s) Oral daily  lamoTRIgine 300 milliGRAM(s) Oral daily  levothyroxine 25 MICROGram(s) Oral daily  mesalamine DR Capsule 400 milliGRAM(s) Oral daily  piperacillin/tazobactam IVPB.. 4.5 Gram(s) IV Intermittent every 6 hours    MEDICATIONS  (PRN):  acetaminophen   Tablet .. 650 milliGRAM(s) Oral every 6 hours PRN Temp greater or equal to 38C (100.4F), Mild Pain (1 - 3)  ketorolac   Injectable 15 milliGRAM(s) IV Push every 6 hours PRN Moderate Pain (4 - 6)      Labs:                        11.2   8.30  )-----------( 253      ( 12 Aug 2021 08:20 )             35.5     08-12    139  |  103  |  6<L>  ----------------------------<  134<H>  3.7   |  28  |  0.62    Ca    9.2      12 Aug 2021 08:20  Mg     2.0     08-12    TPro  7.4  /  Alb  4.3  /  TBili  0.3  /  DBili  x   /  AST  14  /  ALT  14  /  AlkPhos  71  08-11        COVID-19 PCR: NotDetec (11 Aug 2021 15:48)      Radiology: Reviewed Internal Medicine Progress Note  Amalia Nia, PGY-1  Pager: [phone]    ******INCOMPLETE******    Patient is a 32 y/o F with PMH of bipolar disorder, endometriosis, and ulcerative colitis who presented 8/11 for worsening right ear pain and swelling x4d being treated for sepsis 2/2 necrotizing otitis externa. Ear wick was placed 8/10 during prior ED visit. Initially started on vanc/zosyn and cipro and dexamethasone ear drops. ENT following.    OVERNIGHT EVENTS/INTERVAL HPI:  Pt c/o dizziness starting yesterday afternoon around 4pm and 2-3 episodes of nausea and vomiting every time she got up to use the bathroom. Around midnight she had stabbing pain radiating from her ear to her jaw with associated feeling of pressure that worsened by 5am. She was seen by ENT around 5am, her wick was removed and she admit to some serosanguinous discharge since then.     OBJECTIVE:  Vital Signs Last 24 Hrs  T(C): 37 (13 Aug 2021 05:00), Max: 37.5 (12 Aug 2021 14:03)  T(F): 98.6 (13 Aug 2021 05:00), Max: 99.5 (12 Aug 2021 14:03)  HR: 70 (13 Aug 2021 05:00) (70 - 81)  BP: 112/64 (13 Aug 2021 05:00) (100/65 - 124/79)  BP(mean): 80 (13 Aug 2021 05:00) (80 - 80)  RR: 17 (13 Aug 2021 05:00) (16 - 17)  SpO2: 97% (13 Aug 2021 05:00) (95% - 98%)  I&O's Detail    11 Aug 2021 07:01  -  12 Aug 2021 07:00  --------------------------------------------------------  IN:    IV PiggyBack: 200 mL  Total IN: 200 mL    OUT:    Voided (mL): 1750 mL  Total OUT: 1750 mL    Total NET: -1550 mL      12 Aug 2021 07:01  -  13 Aug 2021 06:20  --------------------------------------------------------  IN:    IV PiggyBack: 200 mL    Oral Fluid: 1280 mL    Sodium Chloride 0.9% Bolus: 500 mL  Total IN: 1980 mL    OUT:    Voided (mL): 1450 mL  Total OUT: 1450 mL    Total NET: 530 mL        Physical Exam:  GENERAL: Awake, alert, in distress from ear pain  NEURO: A&Ox4, no focal deficits, 5/5 strength in all ext, reflexes 2+ throughout  HEENT: TTP of the pinna of her right ear, periauricular erythema, redness and swelling. Serosanguinous discharge noted. Normocephalic, atraumatic, no conjunctivitis or scleral icterus, oral mucosa moist, no oral lesions noted  NECK: Supple, no JVD, no LAD, thyroid not palpable  CARDIAC: Regular rate and rhythm, +S1/S2, no murmurs/rubs/gallops  PULM: Breathing comfortably on RA, clear to auscultation bilaterally, no wheezes/rales/rhonchi, no inspiratory stridor  ABDOMEN: Soft, nontender, nondistended, +bs, no hepatosplenomegaly, no rebound tenderness or fluid wave, no CVA tenderness  : Deferred  MSK: Range of motion grossly intact, no back tenderness  SKIN: Warm and dry, erythema around her right ear to the right jaw.   VASC: 2+ peripheral pulses, no edema  Psych: Appropriate affect    Medications:  MEDICATIONS  (STANDING):  dexAMETHasone 0.1% Ophthalmic Solution for OTIC Use 4 Drop(s) Right Ear two times a day  escitalopram 20 milliGRAM(s) Oral daily  lamoTRIgine 300 milliGRAM(s) Oral daily  levothyroxine 25 MICROGram(s) Oral daily  mesalamine DR Capsule 400 milliGRAM(s) Oral daily  piperacillin/tazobactam IVPB.. 4.5 Gram(s) IV Intermittent every 6 hours    MEDICATIONS  (PRN):  acetaminophen   Tablet .. 650 milliGRAM(s) Oral every 6 hours PRN Temp greater or equal to 38C (100.4F), Mild Pain (1 - 3)  ketorolac   Injectable 15 milliGRAM(s) IV Push every 6 hours PRN Moderate Pain (4 - 6)      Labs:                        11.2   8.30  )-----------( 253      ( 12 Aug 2021 08:20 )             35.5     08-12    139  |  103  |  6<L>  ----------------------------<  134<H>  3.7   |  28  |  0.62    Ca    9.2      12 Aug 2021 08:20  Mg     2.0     08-12    TPro  7.4  /  Alb  4.3  /  TBili  0.3  /  DBili  x   /  AST  14  /  ALT  14  /  AlkPhos  71  08-11        COVID-19 PCR: Maco (11 Aug 2021 15:48)      Radiology: Reviewed Internal Medicine Progress Note  Amalia Nia, PGY-1  Pager: [phone]    ******INCOMPLETE******    Patient is a 30 y/o F with PMH of bipolar disorder, endometriosis, and ulcerative colitis who presented 8/11 for worsening right ear pain and swelling x4d being treated for sepsis 2/2 necrotizing otitis externa. Ear wick was placed 8/10 during prior ED visit. Initially started on vanc/zosyn and cipro and dexamethasone ear drops. ENT following.    OVERNIGHT EVENTS/INTERVAL HPI:  Pt c/o dizziness starting yesterday afternoon around 4pm and 2-3 episodes of nausea and vomiting that occurred when she got up to use the bathroom. Around midnight she had stabbing pain radiating from her ear to her jaw with associated feeling of pressure that worsened by 5am. She was seen by ENT around 5am, her wick was removed and she admit to some serosanguinous discharge since then.     OBJECTIVE:  Vital Signs Last 24 Hrs  T(C): 37 (13 Aug 2021 05:00), Max: 37.5 (12 Aug 2021 14:03)  T(F): 98.6 (13 Aug 2021 05:00), Max: 99.5 (12 Aug 2021 14:03)  HR: 70 (13 Aug 2021 05:00) (70 - 81)  BP: 112/64 (13 Aug 2021 05:00) (100/65 - 124/79)  BP(mean): 80 (13 Aug 2021 05:00) (80 - 80)  RR: 17 (13 Aug 2021 05:00) (16 - 17)  SpO2: 97% (13 Aug 2021 05:00) (95% - 98%)  I&O's Detail    11 Aug 2021 07:01  -  12 Aug 2021 07:00  --------------------------------------------------------  IN:    IV PiggyBack: 200 mL  Total IN: 200 mL    OUT:    Voided (mL): 1750 mL  Total OUT: 1750 mL    Total NET: -1550 mL      12 Aug 2021 07:01  -  13 Aug 2021 06:20  --------------------------------------------------------  IN:    IV PiggyBack: 200 mL    Oral Fluid: 1280 mL    Sodium Chloride 0.9% Bolus: 500 mL  Total IN: 1980 mL    OUT:    Voided (mL): 1450 mL  Total OUT: 1450 mL    Total NET: 530 mL        Physical Exam:  GENERAL: Awake, alert, in distress from ear pain  NEURO: A&Ox4, no focal deficits, 5/5 strength in all ext, reflexes 2+ throughout  HEENT: TTP of the pinna of her right ear, periauricular erythema, redness and swelling. Serosanguinous discharge noted. Normocephalic, atraumatic, no conjunctivitis or scleral icterus, oral mucosa moist, no oral lesions noted  NECK: Supple, no JVD, no LAD, thyroid not palpable  CARDIAC: Regular rate and rhythm, +S1/S2, no murmurs/rubs/gallops  PULM: Breathing comfortably on RA, clear to auscultation bilaterally, no wheezes/rales/rhonchi, no inspiratory stridor  ABDOMEN: Soft, nontender, nondistended, +bs, no hepatosplenomegaly, no rebound tenderness or fluid wave, no CVA tenderness  : Deferred  MSK: Range of motion grossly intact, no back tenderness  SKIN: Warm and dry, erythema around her right ear to the right jaw.   VASC: 2+ peripheral pulses, no edema  Psych: Appropriate affect    Medications:  MEDICATIONS  (STANDING):  dexAMETHasone 0.1% Ophthalmic Solution for OTIC Use 4 Drop(s) Right Ear two times a day  escitalopram 20 milliGRAM(s) Oral daily  lamoTRIgine 300 milliGRAM(s) Oral daily  levothyroxine 25 MICROGram(s) Oral daily  mesalamine DR Capsule 400 milliGRAM(s) Oral daily  piperacillin/tazobactam IVPB.. 4.5 Gram(s) IV Intermittent every 6 hours    MEDICATIONS  (PRN):  acetaminophen   Tablet .. 650 milliGRAM(s) Oral every 6 hours PRN Temp greater or equal to 38C (100.4F), Mild Pain (1 - 3)  ketorolac   Injectable 15 milliGRAM(s) IV Push every 6 hours PRN Moderate Pain (4 - 6)      Labs:                        11.2   8.30  )-----------( 253      ( 12 Aug 2021 08:20 )             35.5     08-12    139  |  103  |  6<L>  ----------------------------<  134<H>  3.7   |  28  |  0.62    Ca    9.2      12 Aug 2021 08:20  Mg     2.0     08-12    TPro  7.4  /  Alb  4.3  /  TBili  0.3  /  DBili  x   /  AST  14  /  ALT  14  /  AlkPhos  71  08-11        COVID-19 PCR: Maco (11 Aug 2021 15:48)      Radiology: Reviewed Internal Medicine Progress Note  Amalia Nia, PGY-1  Pager: [phone]    ******INCOMPLETE******    Patient is a 32 y/o F with PMH of bipolar disorder, endometriosis, and ulcerative colitis who presented 8/11 for worsening right ear pain and swelling x4d being treated for sepsis 2/2 necrotizing otitis externa. Ear wick was placed 8/10 during prior ED visit. Initially started on vanc/zosyn and cipro and dexamethasone ear drops. ENT following.    OVERNIGHT EVENTS/INTERVAL HPI:  Pt c/o dizziness starting yesterday afternoon around 4pm and 2-3 episodes of nausea and vomiting that occurred when she got up to use the bathroom. Around midnight she had stabbing pain radiating from her ear to her jaw with associated feeling of pressure that worsened by 5am. She was seen by ENT around 5am, her wick was removed and she admits to some serosanguinous discharge since then.     OBJECTIVE:  Vital Signs Last 24 Hrs  T(C): 37 (13 Aug 2021 05:00), Max: 37.5 (12 Aug 2021 14:03)  T(F): 98.6 (13 Aug 2021 05:00), Max: 99.5 (12 Aug 2021 14:03)  HR: 70 (13 Aug 2021 05:00) (70 - 81)  BP: 112/64 (13 Aug 2021 05:00) (100/65 - 124/79)  BP(mean): 80 (13 Aug 2021 05:00) (80 - 80)  RR: 17 (13 Aug 2021 05:00) (16 - 17)  SpO2: 97% (13 Aug 2021 05:00) (95% - 98%)  I&O's Detail    11 Aug 2021 07:01  -  12 Aug 2021 07:00  --------------------------------------------------------  IN:    IV PiggyBack: 200 mL  Total IN: 200 mL    OUT:    Voided (mL): 1750 mL  Total OUT: 1750 mL    Total NET: -1550 mL      12 Aug 2021 07:01  -  13 Aug 2021 06:20  --------------------------------------------------------  IN:    IV PiggyBack: 200 mL    Oral Fluid: 1280 mL    Sodium Chloride 0.9% Bolus: 500 mL  Total IN: 1980 mL    OUT:    Voided (mL): 1450 mL  Total OUT: 1450 mL    Total NET: 530 mL        Physical Exam:  GENERAL: Awake, alert, in distress from ear pain  NEURO: A&Ox4, no focal deficits, 5/5 strength in all ext, reflexes 2+ throughout  HEENT: TTP of the pinna of her right ear, periauricular erythema, redness and swelling. Serosanguinous discharge noted. Normocephalic, atraumatic, no conjunctivitis or scleral icterus, oral mucosa moist, no oral lesions noted  NECK: Supple, no JVD, no LAD, thyroid not palpable  CARDIAC: Regular rate and rhythm, +S1/S2, no murmurs/rubs/gallops  PULM: Breathing comfortably on RA, clear to auscultation bilaterally, no wheezes/rales/rhonchi, no inspiratory stridor  ABDOMEN: Soft, nontender, nondistended, +bs, no hepatosplenomegaly, no rebound tenderness or fluid wave, no CVA tenderness  : Deferred  MSK: Range of motion grossly intact, no back tenderness  SKIN: Warm and dry, erythema around her right ear to the right jaw.   VASC: 2+ peripheral pulses, no edema  Psych: Appropriate affect    Medications:  MEDICATIONS  (STANDING):  dexAMETHasone 0.1% Ophthalmic Solution for OTIC Use 4 Drop(s) Right Ear two times a day  escitalopram 20 milliGRAM(s) Oral daily  lamoTRIgine 300 milliGRAM(s) Oral daily  levothyroxine 25 MICROGram(s) Oral daily  mesalamine DR Capsule 400 milliGRAM(s) Oral daily  piperacillin/tazobactam IVPB.. 4.5 Gram(s) IV Intermittent every 6 hours    MEDICATIONS  (PRN):  acetaminophen   Tablet .. 650 milliGRAM(s) Oral every 6 hours PRN Temp greater or equal to 38C (100.4F), Mild Pain (1 - 3)  ketorolac   Injectable 15 milliGRAM(s) IV Push every 6 hours PRN Moderate Pain (4 - 6)      Labs:                        11.2   8.30  )-----------( 253      ( 12 Aug 2021 08:20 )             35.5     08-12    139  |  103  |  6<L>  ----------------------------<  134<H>  3.7   |  28  |  0.62    Ca    9.2      12 Aug 2021 08:20  Mg     2.0     08-12    TPro  7.4  /  Alb  4.3  /  TBili  0.3  /  DBili  x   /  AST  14  /  ALT  14  /  AlkPhos  71  08-11        COVID-19 PCR: Maco (11 Aug 2021 15:48)      Radiology: Reviewed Internal Medicine Progress Note  Amalia Nia, PGY-1  Pager: [phone]    Hospital Course:  Patient is a 30 y/o F with PMH of bipolar disorder, endometriosis, and ulcerative colitis who presented 8/11 for worsening right ear pain and swelling x4d being treated for sepsis 2/2 necrotizing otitis externa. Ear wick was placed 8/10 during prior ED visit. Initially started on vanc/zosyn and cipro and dexamethasone ear drops, now on zosyn and dexamethasone drops only. wick removed 8/13 for better culture data. ENT following.    OVERNIGHT EVENTS/INTERVAL HPI:  Patient was given a 500cc NS bolus yesterday. Pt c/o dizziness starting yesterday afternoon around 4pm and 2-3 episodes of nausea and vomiting that occurred when she got up to use the bathroom. Around midnight she had stabbing pain radiating from her ear to her jaw with associated feeling of pressure that worsened by 5am. She was seen by ENT around 5am, her wick was removed and she has had some serosanguinous discharge since then. This morning, patient examined at bedside. Continues to c/o shooting pain from R ear down jaw and to R shoulder. Continues to have episodes of n/v when standing. Hearing continues to be poor in R ear. Patient also notes "orange" urine which she believes is concentrated 2/2 dehydration. Continues to have poor PO. No dysuria. No HA, CP, SOB, or abd pain.    OBJECTIVE:  Vital Signs Last 24 Hrs  T(C): 37 (13 Aug 2021 05:00), Max: 37.5 (12 Aug 2021 14:03)  T(F): 98.6 (13 Aug 2021 05:00), Max: 99.5 (12 Aug 2021 14:03)  HR: 70 (13 Aug 2021 05:00) (70 - 81)  BP: 112/64 (13 Aug 2021 05:00) (100/65 - 124/79)  BP(mean): 80 (13 Aug 2021 05:00) (80 - 80)  RR: 17 (13 Aug 2021 05:00) (16 - 17)  SpO2: 97% (13 Aug 2021 05:00) (95% - 98%)  I&O's Detail    11 Aug 2021 07:01  -  12 Aug 2021 07:00  --------------------------------------------------------  IN:    IV PiggyBack: 200 mL  Total IN: 200 mL    OUT:    Voided (mL): 1750 mL  Total OUT: 1750 mL    Total NET: -1550 mL      12 Aug 2021 07:01  -  13 Aug 2021 06:20  --------------------------------------------------------  IN:    IV PiggyBack: 200 mL    Oral Fluid: 1280 mL    Sodium Chloride 0.9% Bolus: 500 mL  Total IN: 1980 mL    OUT:    Voided (mL): 1450 mL  Total OUT: 1450 mL    Total NET: 530 mL    Physical Exam:  GENERAL: Awake, alert, in distress from ear pain  NEURO: A&Ox4, no focal deficits  HEENT: TTP of the pinna of her right ear with serosanguinous and purulent discharge noted. Normocephalic, atraumatic, no conjunctivitis or scleral icterus, oral mucosa moist  NECK: Supple  CARDIAC: Regular rate and rhythm, +S1/S2, no murmurs/rubs/gallops  PULM: Breathing comfortably on RA, clear to auscultation bilaterally, no wheezes/rales/rhonchi  ABDOMEN: Obese, soft, nontender, nondistended, +bs  : Deferred  MSK: Range of motion grossly intact  SKIN: Warm and dry  VASC: 2+ peripheral pulses, no edema  Psych: Appropriate affect    Medications:  MEDICATIONS  (STANDING):  dexAMETHasone 0.1% Ophthalmic Solution for OTIC Use 4 Drop(s) Right Ear two times a day  escitalopram 20 milliGRAM(s) Oral daily  lamoTRIgine 300 milliGRAM(s) Oral daily  levothyroxine 25 MICROGram(s) Oral daily  mesalamine DR Capsule 400 milliGRAM(s) Oral daily  piperacillin/tazobactam IVPB.. 4.5 Gram(s) IV Intermittent every 6 hours    MEDICATIONS  (PRN):  acetaminophen   Tablet .. 650 milliGRAM(s) Oral every 6 hours PRN Temp greater or equal to 38C (100.4F), Mild Pain (1 - 3)  ketorolac   Injectable 15 milliGRAM(s) IV Push every 6 hours PRN Moderate Pain (4 - 6)      Labs:                        11.2   8.30  )-----------( 253      ( 12 Aug 2021 08:20 )             35.5     08-12    139  |  103  |  6<L>  ----------------------------<  134<H>  3.7   |  28  |  0.62    Ca    9.2      12 Aug 2021 08:20  Mg     2.0     08-12    TPro  7.4  /  Alb  4.3  /  TBili  0.3  /  DBili  x   /  AST  14  /  ALT  14  /  AlkPhos  71  08-11        COVID-19 PCR: NotDetec (11 Aug 2021 15:48)      Radiology: Reviewed Internal Medicine Progress Note  Amalia Nia, PGY-1  Pager: [phone]    Hospital Course:  Patient is a 30 y/o F with PMH of bipolar disorder, endometriosis, and ulcerative colitis who presented 8/11 for worsening right ear pain and swelling x4d being treated for sepsis 2/2 necrotizing otitis externa. Ear wick was placed 8/10 during prior ED visit. Initially started on vanc/zosyn and cipro and dexamethasone ear drops, now on zosyn and dexamethasone drops only. wick removed 8/13 for better culture data. ENT following.    OVERNIGHT EVENTS/INTERVAL HPI:  Patient was given a 500cc NS bolus yesterday. Pt c/o dizziness starting yesterday afternoon around 4pm and 2-3 episodes of nausea and vomiting that occurred when she got up to use the bathroom. Around midnight she had stabbing pain radiating from her ear to her jaw with associated feeling of pressure that worsened by 5am. She was seen by ENT around 5am, her wick was removed and she has had some serosanguinous discharge since then. This morning, patient examined at bedside. Continues to c/o shooting pain from R ear down jaw and to R shoulder. Continues to have episodes of n/v when standing. Hearing continues to be poor in R ear. Patient also notes "orange" urine which she believes is concentrated 2/2 dehydration. Continues to have poor PO. No dysuria. No HA, CP, SOB, or abd pain.    OBJECTIVE:  Vital Signs Last 24 Hrs  T(C): 37 (13 Aug 2021 05:00), Max: 37.5 (12 Aug 2021 14:03)  T(F): 98.6 (13 Aug 2021 05:00), Max: 99.5 (12 Aug 2021 14:03)  HR: 70 (13 Aug 2021 05:00) (70 - 81)  BP: 112/64 (13 Aug 2021 05:00) (100/65 - 124/79)  BP(mean): 80 (13 Aug 2021 05:00) (80 - 80)  RR: 17 (13 Aug 2021 05:00) (16 - 17)  SpO2: 97% (13 Aug 2021 05:00) (95% - 98%)  I&O's Detail    11 Aug 2021 07:01  -  12 Aug 2021 07:00  --------------------------------------------------------  IN:    IV PiggyBack: 200 mL  Total IN: 200 mL    OUT:    Voided (mL): 1750 mL  Total OUT: 1750 mL    Total NET: -1550 mL      12 Aug 2021 07:01  -  13 Aug 2021 06:20  --------------------------------------------------------  IN:    IV PiggyBack: 200 mL    Oral Fluid: 1280 mL    Sodium Chloride 0.9% Bolus: 500 mL  Total IN: 1980 mL    OUT:    Voided (mL): 1450 mL  Total OUT: 1450 mL    Total NET: 530 mL    Physical Exam:  GENERAL: Awake, alert, in distress from ear pain  NEURO: A&Ox4, no focal deficits  HEENT: TTP of the pinna of her right ear with serosanguinous and purulent discharge noted. Hearing diminished in R ear. Normocephalic, atraumatic, no conjunctivitis or scleral icterus, oral mucosa moist  NECK: Supple  CARDIAC: Regular rate and rhythm, +S1/S2, no murmurs/rubs/gallops  PULM: Breathing comfortably on RA, clear to auscultation bilaterally, no wheezes/rales/rhonchi  ABDOMEN: Obese, soft, nontender, nondistended, +bs  : Deferred  MSK: Range of motion grossly intact  SKIN: Warm and dry  VASC: 2+ peripheral pulses, no edema  Psych: Appropriate affect    Medications:  MEDICATIONS  (STANDING):  dexAMETHasone 0.1% Ophthalmic Solution for OTIC Use 4 Drop(s) Right Ear two times a day  escitalopram 20 milliGRAM(s) Oral daily  lamoTRIgine 300 milliGRAM(s) Oral daily  levothyroxine 25 MICROGram(s) Oral daily  mesalamine DR Capsule 400 milliGRAM(s) Oral daily  piperacillin/tazobactam IVPB.. 4.5 Gram(s) IV Intermittent every 6 hours    MEDICATIONS  (PRN):  acetaminophen   Tablet .. 650 milliGRAM(s) Oral every 6 hours PRN Temp greater or equal to 38C (100.4F), Mild Pain (1 - 3)  ketorolac   Injectable 15 milliGRAM(s) IV Push every 6 hours PRN Moderate Pain (4 - 6)      Labs:                        11.2   8.30  )-----------( 253      ( 12 Aug 2021 08:20 )             35.5     08-12    139  |  103  |  6<L>  ----------------------------<  134<H>  3.7   |  28  |  0.62    Ca    9.2      12 Aug 2021 08:20  Mg     2.0     08-12    TPro  7.4  /  Alb  4.3  /  TBili  0.3  /  DBili  x   /  AST  14  /  ALT  14  /  AlkPhos  71  08-11        COVID-19 PCR: NotDetec (11 Aug 2021 15:48)      Radiology: Reviewed

## 2021-08-13 NOTE — PROGRESS NOTE ADULT - SUBJECTIVE AND OBJECTIVE BOX
08-12 Patient seen at bedside and discussed with attending. no acute events overnight. improved erythema this AM. Patient continues to have piercing in R ear.   08-13 Patient seen at bedside and discussed with attending. complaining of pain but post auricular erythema continues to improve. wick removed, fluid sent for culture.    ALLERGIES  ketamine (Unknown)    MEDICATIONS  (STANDING):  acetaminophen   Tablet .. 975 milliGRAM(s) Oral every 8 hours  dexAMETHasone 0.1% Ophthalmic Solution for OTIC Use 4 Drop(s) Right Ear two times a day  escitalopram 20 milliGRAM(s) Oral daily  lamoTRIgine 300 milliGRAM(s) Oral daily  levothyroxine 25 MICROGram(s) Oral daily  mesalamine DR Capsule 400 milliGRAM(s) Oral daily  piperacillin/tazobactam IVPB.. 4.5 Gram(s) IV Intermittent every 6 hours    MEDICATIONS  (PRN):  ketorolac   Injectable 15 milliGRAM(s) IV Push every 4 hours PRN Moderate Pain (4 - 6)        LABS                         11.0   7.47  )-----------( 274      ( 13 Aug 2021 07:45 )             34.5    08-12    139  |  103  |  6<L>  ----------------------------<  134<H>  3.7   |  28  |  0.62    Ca    9.2      12 Aug 2021 08:20  Mg     2.0     08-12    TPro  7.4  /  Alb  4.3  /  TBili  0.3  /  DBili  x   /  AST  14  /  ALT  14  /  AlkPhos  71  08-11    LIVER FUNCTIONS - ( 11 Aug 2021 15:48 )  Alb: 4.3 g/dL / Pro: 7.4 g/dL / ALK PHOS: 71 U/L / ALT: 14 U/L / AST: 14 U/L / GGT: x               Culture - Surgical Swab (collected 08-12 @ 13:00)  Source: .Surgical Swab R Ear Fluid  Gram Stain (08-12 @ 16:48):    No organisms seen    No WBC's seen.          INs & OUTs  Drains:       VITAL SIGNS:  ICU Vital Signs Last 24 Hrs  T(C): 37 (13 Aug 2021 05:00), Max: 37.5 (12 Aug 2021 14:03)  T(F): 98.6 (13 Aug 2021 05:00), Max: 99.5 (12 Aug 2021 14:03)  HR: 70 (13 Aug 2021 05:00) (70 - 81)  BP: 112/64 (13 Aug 2021 05:00) (100/65 - 124/79)  BP(mean): 80 (13 Aug 2021 05:00) (80 - 80)  ABP: --  ABP(mean): --  RR: 17 (13 Aug 2021 05:00) (16 - 17)  SpO2: 97% (13 Aug 2021 05:00) (95% - 98%)            PHYSICAL EXAM:  ENT EXAM-   Constitutional: Well-developed, well-nourished.  No hoarseness.     Head:  normocephalic, atraumatic.   AD: mastoid with much improved erythema, tender to palpation, no fluctuance, pinna erythematous, tender to palpation, no fluctuance, EAC with some purulent drainage, ear wick visible;   AS: mastoid flat, non-tender, no erythema, pinna clear, no erythema, EAC clear, no lesions, TM flat, no erythema, no effusion  Nose:  Septum intact. Inferior turbinates normal bilateral  OC/OP:  Floor of mouth, buccal mucosa, lips, hard palate, soft palate, uvula, posterior pharyngeal wall normal.  Mucosa moist.  Neck:  Tenderness to palpation of R neck levels II-III

## 2021-08-13 NOTE — CHART NOTE - NSCHARTNOTEFT_GEN_A_CORE
Seen and examined this PM at bedside with attending. Noted to have to increased post-auricular erythema and tenderness. Wick replaced.     Plan:   - recommend CT temporal bone WITHOUT contrast as soon as possible  - recommend adding clotrimazole ggt to R ear  - continue IV abx  - ear culture growing Achromobacter xylosoxidans, sensitivities pending but is usually sensitive to meropenem  - recommend ID consult for abx recommendations  - meclizine PRN for vertigo when ambulating Seen and examined this PM at bedside with attending. Noted to have to increased post-auricular erythema and tenderness. Wick replaced.     Plan:   - recommend CT temporal bone WITHOUT contrast as soon as possible  - recommend adding clotrimazole ggt to R ear  - continue IV abx  - ear culture growing Achromobacter xylosoxidans, sensitivities pending but is usually sensitive to meropenem  - recommend ID consult for abx recommendations  - meclizine PRN for vertigo when ambulating  - recommend nasal saline 2 sprays TID for discomfort

## 2021-08-13 NOTE — PROGRESS NOTE ADULT - ASSESSMENT
A/P:  31y Female seen in [facility] ED yesterday, 8/10, with severe right OE and s/p R ear wick placement presenting with uncontrolled pain and inability to tolerate PO foods. Ear wick in place. Mastoid erythema and tenderness worsened 8/11 with improvement this AM.    Plan:  Recommend vancomycin/zosyn for 48h total  d/c on Bactrim DS  Recommend c/w ciprodex drops to R ear BID for 10 days total  Pain control per primary team  f/u cultures  No acute ENT intervention  ENT will continue to follow  Please page ENT with any concerns/questions A/P:  31y Female seen in [facility] ED yesterday, 8/10, with severe right OE and s/p R ear wick placement presenting with uncontrolled pain and inability to tolerate PO foods. Ear wick in place. Mastoid erythema and tenderness worsened 8/11 with improvement this AM.    Plan:  Recommend vancomycin/zosyn for 48h total  d/c on Bactrim DS  Recommend c/w ciprodex drops to R ear BID for 10 days total  Have low suspicion for bony involvement and pt having good response to IV abx, bone scan likely not necessary  Pain control per primary team  f/u cultures  No acute ENT intervention  ENT will continue to follow  Please page ENT with any concerns/questions

## 2021-08-13 NOTE — PROGRESS NOTE ADULT - PROBLEM SELECTOR PLAN 5
F: None  E: Replete as needed  N: soft for jaw pain   DVT ppx: non indicated based on IMPROVE score  Code status: Full code  Dispo: pending clinical response F: None - will start if UOP drops.  E: Replete as needed  N: soft for jaw pain   DVT ppx: non indicated based on IMPROVE score  Code status: Full code  Dispo: home with abx pending sensitivities (may need a PICC)

## 2021-08-13 NOTE — PROGRESS NOTE ADULT - PROBLEM SELECTOR PLAN 2
Diagnosed on colonoscopy 3 months ago though undergoing further rheum w/u for increased inflammatory markers. On Apriso 375mg qd at home  -delzicol 400mg qd while inpatient

## 2021-08-13 NOTE — PROGRESS NOTE ADULT - ASSESSMENT
Patient is a 32 y/o F with PMH of bipolar disorder, endometriosis, and ulcerative colitis who presented with sepsis 2/2 R necrotizing otitis externa on Zosyn. Patient is a 32 y/o F with PMH of bipolar disorder, endometriosis, and ulcerative colitis who presented with sepsis 2/2 R necrotizing otitis externa on Zosyn and dexamethasone drops s/p Wick with pain poorly controlled.

## 2021-08-14 ENCOUNTER — TRANSCRIPTION ENCOUNTER (OUTPATIENT)
Age: 31
End: 2021-08-14

## 2021-08-14 DIAGNOSIS — H60.391 OTHER INFECTIVE OTITIS EXTERNA, RIGHT EAR: ICD-10-CM

## 2021-08-14 DIAGNOSIS — E66.01 MORBID (SEVERE) OBESITY DUE TO EXCESS CALORIES: ICD-10-CM

## 2021-08-14 DIAGNOSIS — R42 DIZZINESS AND GIDDINESS: ICD-10-CM

## 2021-08-14 LAB
-  AZTREONAM: SIGNIFICANT CHANGE UP
-  CEFEPIME: SIGNIFICANT CHANGE UP
-  CEFTRIAXONE: SIGNIFICANT CHANGE UP
-  CIPROFLOXACIN: SIGNIFICANT CHANGE UP
-  GENTAMICIN: SIGNIFICANT CHANGE UP
-  LEVOFLOXACIN: SIGNIFICANT CHANGE UP
-  MEROPENEM: SIGNIFICANT CHANGE UP
-  PIPERACILLIN/TAZOBACTAM: SIGNIFICANT CHANGE UP
-  TOBRAMYCIN: SIGNIFICANT CHANGE UP
ANION GAP SERPL CALC-SCNC: 9 MMOL/L — SIGNIFICANT CHANGE UP (ref 5–17)
BASOPHILS # BLD AUTO: 0.03 K/UL — SIGNIFICANT CHANGE UP (ref 0–0.2)
BASOPHILS NFR BLD AUTO: 0.4 % — SIGNIFICANT CHANGE UP (ref 0–2)
BUN SERPL-MCNC: 7 MG/DL — SIGNIFICANT CHANGE UP (ref 7–23)
CALCIUM SERPL-MCNC: 9.1 MG/DL — SIGNIFICANT CHANGE UP (ref 8.4–10.5)
CHLORIDE SERPL-SCNC: 103 MMOL/L — SIGNIFICANT CHANGE UP (ref 96–108)
CO2 SERPL-SCNC: 26 MMOL/L — SIGNIFICANT CHANGE UP (ref 22–31)
CREAT SERPL-MCNC: 0.65 MG/DL — SIGNIFICANT CHANGE UP (ref 0.5–1.3)
CRP SERPL-MCNC: 102.9 MG/L — HIGH (ref 0–4)
CULTURE RESULTS: SIGNIFICANT CHANGE UP
EOSINOPHIL # BLD AUTO: 0.17 K/UL — SIGNIFICANT CHANGE UP (ref 0–0.5)
EOSINOPHIL NFR BLD AUTO: 2.2 % — SIGNIFICANT CHANGE UP (ref 0–6)
ERYTHROCYTE [SEDIMENTATION RATE] IN BLOOD: 102 MM/HR — HIGH
GLUCOSE SERPL-MCNC: 108 MG/DL — HIGH (ref 70–99)
HCT VFR BLD CALC: 33.2 % — LOW (ref 34.5–45)
HGB BLD-MCNC: 10.4 G/DL — LOW (ref 11.5–15.5)
IMM GRANULOCYTES NFR BLD AUTO: 0.4 % — SIGNIFICANT CHANGE UP (ref 0–1.5)
LYMPHOCYTES # BLD AUTO: 1.92 K/UL — SIGNIFICANT CHANGE UP (ref 1–3.3)
LYMPHOCYTES # BLD AUTO: 24.8 % — SIGNIFICANT CHANGE UP (ref 13–44)
MCHC RBC-ENTMCNC: 28.2 PG — SIGNIFICANT CHANGE UP (ref 27–34)
MCHC RBC-ENTMCNC: 31.3 GM/DL — LOW (ref 32–36)
MCV RBC AUTO: 90 FL — SIGNIFICANT CHANGE UP (ref 80–100)
METHOD TYPE: SIGNIFICANT CHANGE UP
MONOCYTES # BLD AUTO: 0.49 K/UL — SIGNIFICANT CHANGE UP (ref 0–0.9)
MONOCYTES NFR BLD AUTO: 6.3 % — SIGNIFICANT CHANGE UP (ref 2–14)
NEUTROPHILS # BLD AUTO: 5.1 K/UL — SIGNIFICANT CHANGE UP (ref 1.8–7.4)
NEUTROPHILS NFR BLD AUTO: 65.9 % — SIGNIFICANT CHANGE UP (ref 43–77)
NRBC # BLD: 0 /100 WBCS — SIGNIFICANT CHANGE UP (ref 0–0)
ORGANISM # SPEC MICROSCOPIC CNT: SIGNIFICANT CHANGE UP
ORGANISM # SPEC MICROSCOPIC CNT: SIGNIFICANT CHANGE UP
PLATELET # BLD AUTO: 296 K/UL — SIGNIFICANT CHANGE UP (ref 150–400)
POTASSIUM SERPL-MCNC: 4.2 MMOL/L — SIGNIFICANT CHANGE UP (ref 3.5–5.3)
POTASSIUM SERPL-SCNC: 4.2 MMOL/L — SIGNIFICANT CHANGE UP (ref 3.5–5.3)
RBC # BLD: 3.69 M/UL — LOW (ref 3.8–5.2)
RBC # FLD: 13.7 % — SIGNIFICANT CHANGE UP (ref 10.3–14.5)
SODIUM SERPL-SCNC: 138 MMOL/L — SIGNIFICANT CHANGE UP (ref 135–145)
SPECIMEN SOURCE: SIGNIFICANT CHANGE UP
WBC # BLD: 7.74 K/UL — SIGNIFICANT CHANGE UP (ref 3.8–10.5)
WBC # FLD AUTO: 7.74 K/UL — SIGNIFICANT CHANGE UP (ref 3.8–10.5)

## 2021-08-14 PROCEDURE — 99254 IP/OBS CNSLTJ NEW/EST MOD 60: CPT

## 2021-08-14 PROCEDURE — 70480 CT ORBIT/EAR/FOSSA W/O DYE: CPT | Mod: 26

## 2021-08-14 PROCEDURE — 99233 SBSQ HOSP IP/OBS HIGH 50: CPT | Mod: GC

## 2021-08-14 RX ORDER — DEXAMETHASONE 0.5 MG/5ML
10 ELIXIR ORAL EVERY 8 HOURS
Refills: 0 | Status: COMPLETED | OUTPATIENT
Start: 2021-08-14 | End: 2021-08-16

## 2021-08-14 RX ORDER — SODIUM CHLORIDE 0.65 %
1 AEROSOL, SPRAY (ML) NASAL THREE TIMES A DAY
Refills: 0 | Status: DISCONTINUED | OUTPATIENT
Start: 2021-08-14 | End: 2021-08-16

## 2021-08-14 RX ORDER — MEROPENEM 1 G/30ML
1000 INJECTION INTRAVENOUS EVERY 8 HOURS
Refills: 0 | Status: DISCONTINUED | OUTPATIENT
Start: 2021-08-14 | End: 2021-08-16

## 2021-08-14 RX ORDER — MECLIZINE HCL 12.5 MG
12.5 TABLET ORAL EVERY 6 HOURS
Refills: 0 | Status: DISCONTINUED | OUTPATIENT
Start: 2021-08-14 | End: 2021-08-15

## 2021-08-14 RX ORDER — TRAMADOL HYDROCHLORIDE 50 MG/1
25 TABLET ORAL DAILY
Refills: 0 | Status: DISCONTINUED | OUTPATIENT
Start: 2021-08-14 | End: 2021-08-16

## 2021-08-14 RX ORDER — SODIUM CHLORIDE 9 MG/ML
1000 INJECTION INTRAMUSCULAR; INTRAVENOUS; SUBCUTANEOUS
Refills: 0 | Status: DISCONTINUED | OUTPATIENT
Start: 2021-08-14 | End: 2021-08-16

## 2021-08-14 RX ADMIN — Medication 400 MILLIGRAM(S): at 06:01

## 2021-08-14 RX ADMIN — Medication 975 MILLIGRAM(S): at 23:34

## 2021-08-14 RX ADMIN — SODIUM CHLORIDE 100 MILLILITER(S): 9 INJECTION INTRAMUSCULAR; INTRAVENOUS; SUBCUTANEOUS at 19:48

## 2021-08-14 RX ADMIN — Medication 15 MILLIGRAM(S): at 11:29

## 2021-08-14 RX ADMIN — Medication 102 MILLIGRAM(S): at 12:41

## 2021-08-14 RX ADMIN — Medication 15 MILLIGRAM(S): at 14:55

## 2021-08-14 RX ADMIN — Medication 15 MILLIGRAM(S): at 04:50

## 2021-08-14 RX ADMIN — Medication 975 MILLIGRAM(S): at 06:01

## 2021-08-14 RX ADMIN — Medication 15 MILLIGRAM(S): at 11:44

## 2021-08-14 RX ADMIN — Medication 15 MILLIGRAM(S): at 04:36

## 2021-08-14 RX ADMIN — Medication 15 MILLIGRAM(S): at 14:25

## 2021-08-14 RX ADMIN — Medication 15 MILLIGRAM(S): at 20:50

## 2021-08-14 RX ADMIN — TRAMADOL HYDROCHLORIDE 25 MILLIGRAM(S): 50 TABLET ORAL at 18:34

## 2021-08-14 RX ADMIN — Medication 975 MILLIGRAM(S): at 14:25

## 2021-08-14 RX ADMIN — Medication 15 MILLIGRAM(S): at 01:58

## 2021-08-14 RX ADMIN — Medication 975 MILLIGRAM(S): at 14:55

## 2021-08-14 RX ADMIN — Medication 15 MILLIGRAM(S): at 07:00

## 2021-08-14 RX ADMIN — Medication 15 MILLIGRAM(S): at 02:25

## 2021-08-14 RX ADMIN — PIPERACILLIN AND TAZOBACTAM 200 GRAM(S): 4; .5 INJECTION, POWDER, LYOPHILIZED, FOR SOLUTION INTRAVENOUS at 05:58

## 2021-08-14 RX ADMIN — ESCITALOPRAM OXALATE 20 MILLIGRAM(S): 10 TABLET, FILM COATED ORAL at 11:29

## 2021-08-14 RX ADMIN — MEROPENEM 100 MILLIGRAM(S): 1 INJECTION INTRAVENOUS at 17:30

## 2021-08-14 RX ADMIN — TRAMADOL HYDROCHLORIDE 25 MILLIGRAM(S): 50 TABLET ORAL at 19:04

## 2021-08-14 RX ADMIN — Medication 4 DROP(S): at 05:59

## 2021-08-14 RX ADMIN — Medication 975 MILLIGRAM(S): at 06:30

## 2021-08-14 RX ADMIN — Medication 15 MILLIGRAM(S): at 06:52

## 2021-08-14 RX ADMIN — LAMOTRIGINE 300 MILLIGRAM(S): 25 TABLET, ORALLY DISINTEGRATING ORAL at 11:29

## 2021-08-14 RX ADMIN — Medication 102 MILLIGRAM(S): at 20:51

## 2021-08-14 RX ADMIN — Medication 15 MILLIGRAM(S): at 21:05

## 2021-08-14 RX ADMIN — Medication 25 MICROGRAM(S): at 06:01

## 2021-08-14 RX ADMIN — PIPERACILLIN AND TAZOBACTAM 200 GRAM(S): 4; .5 INJECTION, POWDER, LYOPHILIZED, FOR SOLUTION INTRAVENOUS at 11:28

## 2021-08-14 NOTE — DISCHARGE NOTE PROVIDER - HOSPITAL COURSE
Patient is a 30 y/o F with PMH of bipolar disorder, endometriosis, and ulcerative colitis who presented with sepsis 2/2 R otitis externa on 8/11.    Hospital course (by problem):   #R otitis externa:  Patient initially came to the ED on 08/10 and was seen by ENT and diagnosed with otitis externa. ENT placed Ear wick in R ear and patient was discharged with PO ciprofloxacin, ciprodex drops, and instructions to follow up with Dr. Wilder on Friday, 8/13. However, returned with worsening pain 8/11.  -R ear culture grew Achromobacter xylosoxidans with sensitivities to meropenum and zosyn  -CT scan 8/10 w/ otitis externa/media without c/f osteomyelitis  -initially treated with vanc/zosyn and ciprodex drops. Vanc d/c'd 8/12. Cipro drops held for cx then restarted.  -pain control with toradol and tylenol  -Given IV decadron 10mg q8h x48h as per ENT 8/14 and 8/15  -ENT and ID followed.  -d/c'd on ____ for __ days.    #Ulcerative colitis: home aprisol 375mg qd  -delzicol 400mg qd while inpatient     #Bipolar disorder  -continued home lexapro, lamictal, and levothyroxine    #Endometriosis  -per patient's preference, held OCP inpatient    Patient was discharged to: home with (PICC?)    New medications:   Changes to old medications:  Medications that were stopped: None    Items to follow up as outpatient: f/u ENT outpatient    Physical exam at the time of discharge:       Patient is a 30 y/o F with PMH of bipolar disorder, endometriosis, and ulcerative colitis who presented with sepsis 2/2 R otitis externa on 8/11.    Hospital course (by problem):   #R otitis externa:  Patient initially came to the ED on 08/10 and was seen by ENT and diagnosed with otitis externa. ENT placed Ear wick in R ear and patient was discharged with PO ciprofloxacin, ciprodex drops, and instructions to follow up with Dr. Wilder on Friday, 8/13. However, returned with worsening pain 8/11.  -R ear culture grew Achromobacter xylosoxidans with sensitivities to Bactrim, meropenum, and zosyn as well as pan-sensitive Psuedomonas  -CT scan 8/10 w/ otitis externa/media without c/f osteomyelitis and repeat scan   -initially treated with vanc/zosyn and ciprodex drops. Vanc d/c'd 8/12. Cipro drops held for cx then restarted.  -pain control with toradol and tylenol  -vertigo control with Clopazine  -Given IV decadron 10mg q8h x48h as per ENT 8/14 and 8/15  -ENT and ID followed.  -d/c'd on Bactrim DS 2 tabs BID, ciprofloxacin 750mg BID, and ciprodex drops until 8/23    #Ulcerative colitis: home aprisol 375mg qd  -delzicol 400mg qd while inpatient     #Bipolar disorder  -continued home lexapro, lamictal, and levothyroxine    #Endometriosis  -per patient's preference, held OCP inpatient    Patient was discharged to: home    New medications: Bactrim DS 2 tabs BID, ciprofloxacin 750mg BID, and ciprodex drops until 8/23; Clopazine 5mg  Changes to old medications: None  Medications that were stopped: None    Items to follow up as outpatient: f/u ENT outpatient on 8/18 to evaluate Wick    Physical exam at the time of discharge:       Patient is a 32 y/o F with PMH of bipolar disorder, endometriosis, and ulcerative colitis who presented with sepsis 2/2 R otitis externa on 8/11.    Hospital course (by problem):   #R otitis externa:  Patient initially came to the ED on 08/10 and was seen by ENT and diagnosed with otitis externa. ENT placed Ear wick in R ear and patient was discharged with PO ciprofloxacin, ciprodex drops, and instructions to follow up with Dr. Wilder on Friday, 8/13. However, returned with worsening pain 8/11.  -R ear culture grew Achromobacter xylosoxidans with sensitivities to Bactrim, meropenum, and zosyn as well as pan-sensitive Psuedomonas  -CT scan 8/10 w/ otitis externa/media without c/f osteomyelitis and repeat scan   -initially treated with vanc/zosyn and ciprodex drops. Vanc d/c'd 8/12. Cipro drops held for cx then restarted.  -pain control with toradol and tylenol  -vertigo control with Clopazine  -Given IV decadron 10mg q8h x48h as per ENT 8/14 and 8/15  -ENT and ID followed.  -d/c'd on Bactrim DS 2 tabs BID, ciprofloxacin 750mg BID, and ciprodex drops 4 drops BID until 8/23    #Ulcerative colitis: home aprisol 375mg qd  -delzicol 400mg qd while inpatient     #Bipolar disorder  -continued home lexapro, lamictal, and levothyroxine    #Endometriosis  -per patient's preference, held OCP inpatient    Patient was discharged to: home    New medications: Bactrim DS 2 tabs BID, ciprofloxacin 750mg BID, and ciprodex drops until 8/23; Clopazine 5mg  Changes to old medications: None  Medications that were stopped: None    Items to follow up as outpatient: f/u ENT outpatient with Dr. Wilder on 8/18 to evaluate Wick    Physical exam at the time of discharge:  Vital Signs Last 24 Hrs  T(C): 37.1 (16 Aug 2021 13:42), Max: 37.4 (15 Aug 2021 20:30)  T(F): 98.7 (16 Aug 2021 13:42), Max: 99.4 (15 Aug 2021 20:30)  HR: 76 (16 Aug 2021 13:42) (55 - 80)  BP: 116/71 (16 Aug 2021 13:42) (116/71 - 132/79)  RR: 18 (16 Aug 2021 13:42) (17 - 18)  SpO2: 96% (16 Aug 2021 13:42) (94% - 98%)    Physical Exam:  GENERAL: Awake, alert, laying comfortably in bed  NEURO: A&Ox4, no focal deficits  HEENT: TTP of the pinna of her right ear with purulent discharge noted. Hearing diminished in R ear. Oral mucosa moist  NECK: Supple  CARDIAC: Regular rate and rhythm, +S1/S2, no murmurs/rubs/gallops  PULM: Breathing comfortably on RA, clear to auscultation bilaterally, no wheezes/rales/rhonchi  ABDOMEN: Obese, soft, nontender, nondistended, +bs  : Deferred  MSK: Range of motion grossly intact  SKIN: Warm and dry  VASC: 2+ peripheral pulses, no edema  Psych: Appropriate affect   Patient is a 30 y/o F with PMH of bipolar disorder, endometriosis, and ulcerative colitis who presented with sepsis 2/2 R otitis externa on 8/11.    Hospital course (by problem):   #R otitis externa:  Patient initially came to the ED on 08/10 and was seen by ENT and diagnosed with otitis externa. ENT placed Ear wick in R ear and patient was discharged with PO ciprofloxacin, ciprodex drops, and instructions to follow up with Dr. Wilder on Friday, 8/13. However, returned with worsening pain 8/11.  -R ear culture grew Achromobacter xylosoxidans with sensitivities to Bactrim, meropenum, and zosyn as well as pan-sensitive Psuedomonas  -CT scan 8/10 w/ otitis externa/media without c/f osteomyelitis and repeat scan   -initially treated with vanc/zosyn and ciprodex drops. Vanc d/c'd 8/12. Cipro drops held for cx then restarted.  -pain control with toradol and tylenol  -vertigo control with Clopazine  -Given IV decadron 10mg q8h x48h as per ENT 8/14 and 8/15  -ENT and ID followed.  -d/c'd on Bactrim DS 2 tabs BID, ciprofloxacin 750mg BID, and ciprodex drops 4 drops BID until 8/23    #Ulcerative colitis: home aprisol 375mg qd  -delzicol 400mg qd while inpatient     #Bipolar disorder  -continued home lexapro, lamictal, and levothyroxine    #Endometriosis  -per patient's preference, held OCP inpatient    Patient was discharged to: home    New medications: Bactrim DS 2 tabs BID, ciprofloxacin 750mg BID, and ciprodex drops until 8/23; Clopazine 5mg  Changes to old medications: None  Medications that were stopped: None    Items to follow up as outpatient: f/u ENT outpatient with Dr. Wilder on 8/19 to evaluate Wick    Physical exam at the time of discharge:  Vital Signs Last 24 Hrs  T(C): 37.1 (16 Aug 2021 13:42), Max: 37.4 (15 Aug 2021 20:30)  T(F): 98.7 (16 Aug 2021 13:42), Max: 99.4 (15 Aug 2021 20:30)  HR: 76 (16 Aug 2021 13:42) (55 - 80)  BP: 116/71 (16 Aug 2021 13:42) (116/71 - 132/79)  RR: 18 (16 Aug 2021 13:42) (17 - 18)  SpO2: 96% (16 Aug 2021 13:42) (94% - 98%)    Physical Exam:  GENERAL: Awake, alert, laying comfortably in bed  NEURO: A&Ox4, no focal deficits  HEENT: TTP of the pinna of her right ear with purulent discharge noted. Hearing diminished in R ear. Oral mucosa moist  NECK: Supple  CARDIAC: Regular rate and rhythm, +S1/S2, no murmurs/rubs/gallops  PULM: Breathing comfortably on RA, clear to auscultation bilaterally, no wheezes/rales/rhonchi  ABDOMEN: Obese, soft, nontender, nondistended, +bs  : Deferred  MSK: Range of motion grossly intact  SKIN: Warm and dry  VASC: 2+ peripheral pulses, no edema  Psych: Appropriate affect

## 2021-08-14 NOTE — PROGRESS NOTE ADULT - SUBJECTIVE AND OBJECTIVE BOX
Patient is a 31y old  Female who presents with a chief complaint of otitis externa (14 Aug 2021 12:22)      INTERVAL HPI/OVERNIGHT EVENTS:    Pt. seen and examined earlier today  Pt. reports R ear pain improved overall, but intermittently sharp/severe  Pt. c/o intermittent vertigo and N/V; R ear w/ diminished hearing  reportedly w/ increased drainage last night; wick replaced by ENT  No F/C    Review of Systems: 12 point review of systems otherwise negative    MEDICATIONS  (STANDING):  acetaminophen   Tablet .. 975 milliGRAM(s) Oral every 8 hours  dexAMETHasone  IVPB 10 milliGRAM(s) IV Intermittent every 8 hours  dexAMETHasone 0.1% Ophthalmic Solution for OTIC Use 4 Drop(s) Right Ear two times a day  escitalopram 20 milliGRAM(s) Oral daily  ketorolac   Injectable 15 milliGRAM(s) IV Push every 6 hours  lamoTRIgine 300 milliGRAM(s) Oral daily  levothyroxine 25 MICROGram(s) Oral daily  meropenem  IVPB 1000 milliGRAM(s) IV Intermittent every 8 hours  mesalamine DR Capsule 400 milliGRAM(s) Oral daily  sodium chloride 0.9%. 1000 milliLiter(s) (100 mL/Hr) IV Continuous <Continuous>    MEDICATIONS  (PRN):  ketorolac   Injectable 15 milliGRAM(s) IV Push every 6 hours PRN Severe Pain (7 - 10)  meclizine 12.5 milliGRAM(s) Oral every 6 hours PRN vertigo  sodium chloride 0.65% Nasal 1 Spray(s) Both Nostrils three times a day PRN Nasal Congestion  traMADol 25 milliGRAM(s) Oral daily PRN Moderate Pain (4 - 6)      Allergies    ketamine (Unknown)    Intolerances          Vital Signs Last 24 Hrs  T(C): 37.4 (14 Aug 2021 16:43), Max: 37.4 (14 Aug 2021 16:43)  T(F): 99.4 (14 Aug 2021 16:43), Max: 99.4 (14 Aug 2021 16:43)  HR: 69 (14 Aug 2021 16:43) (62 - 83)  BP: 124/77 (14 Aug 2021 16:43) (108/73 - 139/82)  BP(mean): --  RR: 18 (14 Aug 2021 16:43) (17 - 19)  SpO2: 96% (14 Aug 2021 16:43) (93% - 96%)  CAPILLARY BLOOD GLUCOSE          08-13 @ 07:01  -  08-14 @ 07:00  --------------------------------------------------------  IN: 1550 mL / OUT: 1550 mL / NET: 0 mL    08-14 @ 07:01 - 08-14 @ 17:20  --------------------------------------------------------  IN: 540 mL / OUT: 1400 mL / NET: -860 mL        Physical Exam:  (earlier today)  Daily     Daily   General:  non-toxic appearing in NAD  HEENT:  R ear postauricular erythema improved per pen markings, pinna TTP w/ erythema; decreased hearing; +wick  Abdomen:  morbidly obese  Skin:  WWP  Neuro:  AAOx3  LABS:                        10.4   7.74  )-----------( 296      ( 14 Aug 2021 07:31 )             33.2     08-14    138  |  103  |  7   ----------------------------<  108<H>  4.2   |  26  |  0.65    Ca    9.1      14 Aug 2021 07:31

## 2021-08-14 NOTE — CHART NOTE - NSCHARTNOTEFT_GEN_A_CORE
Patient seen and examined at bedside and discussed with attending. Appears well, erythema mildly improved. Cultures sensitive to meropenem, ordered. Fabio/zosyn dc'ed.     Plan:   CT temporal bone without concerning findings  Recommend starting Vosol HC (acetic acid/hydrocortisone) ear drops to R ear   Continue IV abx per ID, meropenem and d/c ciprodex  Ear cultures growing Achromobacter xylosoxidans  Recommend adding clotrimazole ggt to R ear  Trend ESR/CRP  Decadron 10 q8h x48h  Pain control per primary team  Continue meclizine for vertigo  f/u cultures  No acute ENT intervention  ENT will continue to follow  Please page ENT with any concerns/questions Patient seen and examined at bedside and discussed with attending. Appears well, erythema mildly improved. Cultures sensitive to meropenem, ordered. Fabio/zosyn dc'ed.     Plan:   CT temporal bone showing dehischence in R tegment tympani  Recommend starting Vosol HC (acetic acid/hydrocortisone) ear drops to R ear   Continue IV abx per ID, meropenem and d/c ciprodex  Ear cultures growing Achromobacter xylosoxidans  Recommend adding clotrimazole ggt to R ear  Trend ESR/CRP  Decadron 10 q8h x48h  Pain control per primary team  Continue meclizine for vertigo  f/u cultures  No acute ENT intervention  ENT will continue to follow  Please page ENT with any concerns/questions Patient seen and examined at bedside and discussed with attending. Appears well, erythema mildly improved. Cultures sensitive to meropenem, ordered. Fabio/zosyn dc'ed.     Plan:   CT temporal bone showing dehischence in R tegmen tympani  Recommend starting Vosol HC (acetic acid/hydrocortisone) ear drops to R ear   Continue IV abx per ID, meropenem and d/c ciprodex  Ear cultures growing Achromobacter xylosoxidans  Recommend adding clotrimazole ggt to R ear  Trend ESR/CRP  Decadron 10 q8h x48h  Pain control per primary team  Continue meclizine for vertigo  f/u cultures  No acute ENT intervention  ENT will continue to follow  Please page ENT with any concerns/questions Patient seen and examined at bedside and discussed with attending. Appears well, erythema mildly improved. Cultures sensitive to meropenem, ordered. Fabio/zosyn dc'ed.     Plan:   CT temporal bone showing dehischence in R tegmen tympani, likely incidental finding of normal variant  Recommend starting Vosol HC (acetic acid/hydrocortisone) ear drops to R ear   Continue IV abx per ID, meropenem and d/c ciprodex  Ear cultures growing Achromobacter xylosoxidans  Recommend adding clotrimazole ggt to R ear  Trend ESR/CRP  Decadron 10 q8h x48h  Pain control per primary team  Continue meclizine for vertigo  f/u cultures  No acute ENT intervention  ENT will continue to follow  Please page ENT with any concerns/questions Patient seen and examined at bedside and discussed with attending. Appears well, erythema mildly improved. Cultures sensitive to meropenem, ordered. Fabio/zosyn dc'ed.     Plan:   CT temporal bone showing without concerning findings  Recommend starting Vosol HC (acetic acid/hydrocortisone) ear drops to R ear   Continue IV abx per ID, meropenem and d/c ciprodex  Ear cultures growing Achromobacter xylosoxidans  Recommend adding clotrimazole ggt to R ear  Trend ESR/CRP  Decadron 10 q8h x48h  Pain control per primary team  Continue meclizine for vertigo  f/u cultures  No acute ENT intervention  ENT will continue to follow  Please page ENT with any concerns/questions

## 2021-08-14 NOTE — PROGRESS NOTE ADULT - SUBJECTIVE AND OBJECTIVE BOX
Internal Medicine Progress Note  Amalia Nia, PGY-1  Pager: [phone]    ******INCOMPLETE******    Patient is a 31y old  Female who presents with a chief complaint of necrotizing otitis externa (13 Aug 2021 06:19)    OVERNIGHT EVENTS/INTERVAL HPI:    OBJECTIVE:  Vital Signs Last 24 Hrs  T(C): 36.9 (13 Aug 2021 20:50), Max: 37 (13 Aug 2021 14:24)  T(F): 98.4 (13 Aug 2021 20:50), Max: 98.6 (13 Aug 2021 14:24)  HR: 69 (13 Aug 2021 20:50) (67 - 83)  BP: 113/76 (13 Aug 2021 20:50) (105/69 - 139/82)  BP(mean): --  RR: 17 (13 Aug 2021 20:50) (17 - 19)  SpO2: 96% (13 Aug 2021 20:50) (93% - 96%)  I&O's Detail    13 Aug 2021 07:01  -  14 Aug 2021 07:00  --------------------------------------------------------  IN:    IV PiggyBack: 100 mL    Oral Fluid: 600 mL    sodium chloride 0.9%: 850 mL  Total IN: 1550 mL    OUT:    Voided (mL): 1550 mL  Total OUT: 1550 mL    Total NET: 0 mL        Physical Exam:  GENERAL: Awake, alert and interactive, no acute distress, appears comfortable  NEURO: A&Ox4, no focal deficits, 5/5 strength in all ext, reflexes 2+ throughout  HEENT: Normocephalic, atraumatic, CN2-12 intact, no conjunctivitis or scleral icterus, oral mucosa moist, no oral lesions noted  NECK: Supple, no JVD, no LAD, thyroid not palpable  CARDIAC: Regular rate and rhythm, +S1/S2, no murmurs/rubs/gallops  PULM: Breathing comfortably on RA, clear to auscultation bilaterally, no wheezes/rales/rhonchi, no inspiratory stridor  ABDOMEN: Soft, nontender, nondistended, +bs, no hepatosplenomegaly, no rebound tenderness or fluid wave, no CVA tenderness  : Deferred  MSK: Range of motion grossly intact, no back tenderness  SKIN: Warm and dry, no rashes, no lesions  VASC: 2+ peripheral pulses, no edema  Psych: Appropriate affect    Medications:  MEDICATIONS  (STANDING):  acetaminophen   Tablet .. 975 milliGRAM(s) Oral every 8 hours  ciprofloxacin  0.3% Otic Solution 4 Drop(s) Right Ear every 12 hours  dexAMETHasone 0.1% Ophthalmic Solution for OTIC Use 4 Drop(s) Right Ear two times a day  escitalopram 20 milliGRAM(s) Oral daily  ketorolac   Injectable 15 milliGRAM(s) IV Push every 6 hours  lamoTRIgine 300 milliGRAM(s) Oral daily  levothyroxine 25 MICROGram(s) Oral daily  mesalamine DR Capsule 400 milliGRAM(s) Oral daily  piperacillin/tazobactam IVPB.. 4.5 Gram(s) IV Intermittent every 6 hours  sodium chloride 0.9%. 1000 milliLiter(s) (100 mL/Hr) IV Continuous <Continuous>    MEDICATIONS  (PRN):  ketorolac   Injectable 15 milliGRAM(s) IV Push every 6 hours PRN Severe Pain (7 - 10)      Labs:                        10.4   7.74  )-----------( 296      ( 14 Aug 2021 07:31 )             33.2     08-14    138  |  103  |  x   ----------------------------<  108<H>  4.2   |  26  |  0.65    Ca    9.1      14 Aug 2021 07:31          COVID-19 PCR: Gisseltefrancisco (11 Aug 2021 15:48)      Radiology: Reviewed Internal Medicine Progress Note  Amalia Ramoner, PGY-1  Pager: [phone]    Hospital Course:  Patient is a 32 y/o F with PMH of bipolar disorder, endometriosis, and ulcerative colitis who presented 8/11 for worsening right ear pain and swelling x4d being treated for otitis externa. Ear wick was placed 8/10 during prior ED visit. Initially started on vanc/zosyn and cipro and dexamethasone ear drops, now on zosyn and ciprodex drops only. wick removed 8/13 for better culture data, replaced 8/14. Continues to have issues with pain control.    OVERNIGHT EVENTS/INTERVAL HPI: Per radiology, osteomyelitis unlikely based on previous scan, however given worsening pain, repeat CT temporal ordered per ENT. Cipro ear drops restarted. Wick replaced. R ear cultures growing Achromobacter xylosoxidans sensitive to only meropenem and zosyn. Was put on 100cc/hr of NS o/n as she felt dehydrated and has had poor PO. This AM, patient continues to c/o R side ear/jaw pain, but it is improved. Also c/o nausea/vertigo with standing and has vomited every time she has gotten up to go to the bathroom. R sided hearing remains diminished. No current nausea, no CP, SOB, diarrhea, or issues with urination.    OBJECTIVE:  Vital Signs Last 24 Hrs  T(C): 36.9 (13 Aug 2021 20:50), Max: 37 (13 Aug 2021 14:24)  T(F): 98.4 (13 Aug 2021 20:50), Max: 98.6 (13 Aug 2021 14:24)  HR: 69 (13 Aug 2021 20:50) (67 - 83)  BP: 113/76 (13 Aug 2021 20:50) (105/69 - 139/82)  BP(mean): --  RR: 17 (13 Aug 2021 20:50) (17 - 19)  SpO2: 96% (13 Aug 2021 20:50) (93% - 96%)  I&O's Detail    13 Aug 2021 07:01  -  14 Aug 2021 07:00  --------------------------------------------------------  IN:    IV PiggyBack: 100 mL    Oral Fluid: 600 mL    sodium chloride 0.9%: 850 mL  Total IN: 1550 mL    OUT:    Voided (mL): 1550 mL  Total OUT: 1550 mL    Total NET: 0 mL    Physical Exam:  GENERAL: Awake, alert, laying comfortably in bed  NEURO: A&Ox4, no focal deficits  HEENT: TTP of the pinna of her right ear with purulent discharge noted. Hearing diminished in R ear. Oral mucosa moist  NECK: Supple  CARDIAC: Regular rate and rhythm, +S1/S2, no murmurs/rubs/gallops  PULM: Breathing comfortably on RA, clear to auscultation bilaterally, no wheezes/rales/rhonchi  ABDOMEN: Obese, soft, nontender, nondistended, +bs  : Deferred  MSK: Range of motion grossly intact  SKIN: Warm and dry  VASC: 2+ peripheral pulses, no edema  Psych: Appropriate affect    Medications:  MEDICATIONS  (STANDING):  acetaminophen   Tablet .. 975 milliGRAM(s) Oral every 8 hours  ciprofloxacin  0.3% Otic Solution 4 Drop(s) Right Ear every 12 hours  dexAMETHasone 0.1% Ophthalmic Solution for OTIC Use 4 Drop(s) Right Ear two times a day  escitalopram 20 milliGRAM(s) Oral daily  ketorolac   Injectable 15 milliGRAM(s) IV Push every 6 hours  lamoTRIgine 300 milliGRAM(s) Oral daily  levothyroxine 25 MICROGram(s) Oral daily  mesalamine DR Capsule 400 milliGRAM(s) Oral daily  piperacillin/tazobactam IVPB.. 4.5 Gram(s) IV Intermittent every 6 hours  sodium chloride 0.9%. 1000 milliLiter(s) (100 mL/Hr) IV Continuous <Continuous>    MEDICATIONS  (PRN):  ketorolac   Injectable 15 milliGRAM(s) IV Push every 6 hours PRN Severe Pain (7 - 10)      Labs:                        10.4   7.74  )-----------( 296      ( 14 Aug 2021 07:31 )             33.2     08-14    138  |  103  |  x   ----------------------------<  108<H>  4.2   |  26  |  0.65    Ca    9.1      14 Aug 2021 07:31          COVID-19 PCR: NotDete (11 Aug 2021 15:48)      Radiology: Reviewed

## 2021-08-14 NOTE — DISCHARGE NOTE PROVIDER - NSDCCPCAREPLAN_GEN_ALL_CORE_FT
PRINCIPAL DISCHARGE DIAGNOSIS  Diagnosis: Other infective acute otitis externa of right ear  Assessment and Plan of Treatment: You came in with right ear pain due to an infection in your outer ear canal. We treated you with antibiotics and pain medications. PLEASE CONTINUE TO TAKE ____ FOR ___ DAYS. PLEASE FOLLOW UP WITH ENT OUTPATINET.       PRINCIPAL DISCHARGE DIAGNOSIS  Diagnosis: Other infective acute otitis externa of right ear  Assessment and Plan of Treatment: You came in with right ear pain due to an infection in your outer ear canal. We treated you with antibiotics and pain medications. PLEASE CONTINUE TO TAKE BACTRIM DS TWO TABS TWICE PER DAY AND CIPROFLOXACIN 750MG TWICE PER DAY UNTIL 8/23. PLEASE FOLLOW UP WITH ENT OUTPATINET ON 8/18. Please also take ciprofloxacin drops as well as the drops you already have from the pharmacy.       PRINCIPAL DISCHARGE DIAGNOSIS  Diagnosis: Other infective acute otitis externa of right ear  Assessment and Plan of Treatment: You came in with right ear pain due to an infection in your outer ear canal. We treated you with antibiotics and pain medications. PLEASE CONTINUE TO TAKE BACTRIM DS TWO TABS TWICE PER DAY AND CIPROFLOXACIN 750MG TWICE PER DAY UNTIL 8/23. PLEASE FOLLOW UP WITH ENT OUTPATINET ON 8/19. Please also take ciprofloxacin drops as well as the drops you already have from the pharmacy.

## 2021-08-14 NOTE — PROGRESS NOTE ADULT - SUBJECTIVE AND OBJECTIVE BOX
08-12 Patient seen at bedside and discussed with attending. no acute events overnight. improved erythema this AM. Patient continues to have piercing in R ear.   08-13 Patient seen at bedside and discussed with attending. complaining of pain but post auricular erythema continues to improve. wick removed, fluid sent for culture.  08-14 Patient seen at bedside and discussed with attending. last night erythema noted to be somewhat worse, wick replaced. continues to have pain.     ALLERGIES  ketamine (Unknown)    MEDICATIONS  (STANDING):  acetaminophen   Tablet .. 975 milliGRAM(s) Oral every 8 hours  ciprofloxacin  0.3% Otic Solution 4 Drop(s) Right Ear every 12 hours  dexAMETHasone 0.1% Ophthalmic Solution for OTIC Use 4 Drop(s) Right Ear two times a day  escitalopram 20 milliGRAM(s) Oral daily  ketorolac   Injectable 15 milliGRAM(s) IV Push every 6 hours  lamoTRIgine 300 milliGRAM(s) Oral daily  levothyroxine 25 MICROGram(s) Oral daily  mesalamine DR Capsule 400 milliGRAM(s) Oral daily  piperacillin/tazobactam IVPB.. 4.5 Gram(s) IV Intermittent every 6 hours  sodium chloride 0.9%. 1000 milliLiter(s) (100 mL/Hr) IV Continuous <Continuous>    MEDICATIONS  (PRN):  ketorolac   Injectable 15 milliGRAM(s) IV Push every 6 hours PRN Severe Pain (7 - 10)        LABS                         10.4   7.74  )-----------( 296      ( 14 Aug 2021 07:31 )             33.2    08-14    138  |  103  |  7   ----------------------------<  108<H>  4.2   |  26  |  0.65    Ca    9.1      14 Aug 2021 07:31                INs & OUTs  Drains:       VITAL SIGNS:  ICU Vital Signs Last 24 Hrs  T(C): 36.9 (14 Aug 2021 08:00), Max: 37 (13 Aug 2021 14:24)  T(F): 98.5 (14 Aug 2021 08:00), Max: 98.6 (13 Aug 2021 14:24)  HR: 62 (14 Aug 2021 08:00) (62 - 83)  BP: 108/73 (14 Aug 2021 08:00) (105/69 - 139/82)  BP(mean): --  ABP: --  ABP(mean): --  RR: 17 (14 Aug 2021 08:00) (17 - 19)  SpO2: 96% (14 Aug 2021 08:00) (93% - 96%)        PHYSICAL EXAM:  ENT EXAM-   Constitutional: Well-developed, well-nourished.  No hoarseness.     Head:  normocephalic, atraumatic.   AD: mastoid with mild erythema, tender to palpation, no fluctuance, pinna mildly erythematous, tender to palpation, no fluctuance, EAC with some purulent drainage, ear wick visible, preauricular edema  AS: mastoid flat, non-tender, no erythema, pinna clear, no erythema, EAC clear, no lesions, TM flat, no erythema, no effusion  Nose:  clear anteriorly  Neck:  Tenderness to palpation of R neck levels II-III

## 2021-08-14 NOTE — DISCHARGE NOTE PROVIDER - PROVIDER TOKENS
PROVIDER:[TOKEN:[[id number]],SCHEDULEDAPPT:[08/18/2021]] PROVIDER:[TOKEN:[[id number]],SCHEDULEDAPPT:[08/19/2021]]

## 2021-08-14 NOTE — PROGRESS NOTE ADULT - ASSESSMENT
Patient is a 30 y/o F with PMH of bipolar disorder, endometriosis, and ulcerative colitis who presented with sepsis 2/2 R otitis externa on Zosyn and ciprodex drops with Wick in place with c/f possible osteomyelitis.

## 2021-08-14 NOTE — PROGRESS NOTE ADULT - PROBLEM SELECTOR PLAN 1
No longer meeting sepsis criteria. CT scan indicative of otitis externa w/o c/f OM with Achromobacter xylosoxidans on culture sensitive to zosyn and shea.  - c/w Zosyn for Achromobacter xylosoxidans  -f/u ID recs  - c/w ciprodex ear drops  - f/u ENT recs: f/u CT temporal  - f/u blood cx from 8/11 - NGTD  - new pain regimen: toradol 15mg IV q6hr prn mod pain, toradol 15mg IV q6hr for prn breakthrough pain, tylenol 975mg [id number] prn mild pain/fever

## 2021-08-14 NOTE — DISCHARGE NOTE PROVIDER - NSDCCAREPROVSEEN_GEN_ALL_CORE_FT
Lavelle Babb Vidal Vargas (infectious disease)  Lavelle Babb (internal medicine)  Stas Wilder (ENT)  Amalia Kramer (internal medicine)

## 2021-08-14 NOTE — CONSULT NOTE ADULT - SUBJECTIVE AND OBJECTIVE BOX
HPI:  Patient is a 30 y/o F with PMH of bipolar disorder, endometriosis, and ulcerative colitis who presents for worsening right ear pain and swelling that started 4 days ago. Patient states she was swimming in beach in [place] and the following day her right her became red, swollen and painful. She came to the ED on 08/10 and was seen by ENT in the ED, was diagnosed with necrotizing otitis externa and  ENT placed Ear wick in R ear  and patient was discharged with PO ciprofloxacin, ciprodex drops, and instructions to follow up with Dr. Wilder on Friday, 8/13. She is back today due to severe pain now radiating down her jaw to her right shoulder and inability to eat 2/2 jaw pain. She denies hx of ear infections She has had fever and chills. She denies HA, blurry vision, tinnitus or hearing loss, or N/V/D.       ED Course:   Vitals: 101.3 Tmax, HR 86, /82, RR 18, 100 O2 on room air   Labs s/f: NA   Imaging: CT maxillofacial: here is asymmetric widening of the right temporomandibular joint with a suggestion of enhancing soft tissue within the posterior aspect of the right temporomandibular joint space. concerning for early necrotizing otitis externa.   Interventions: Zosyn, morphine, 1LNS     Patient admitted for c/f necrotizing otitis externa.  (11 Aug 2021 18:07)      PAST MEDICAL & SURGICAL HISTORY:  Bipolar disorder  endometriosis      Endometriosis    Ulcerative colitis          REVIEW OF SYSTEMS:    General:	 no weakness; no fevers, no chills  Skin/Breast: no rash  Respiratory and Thorax: no SOB, no cough  Cardiovascular:	No chest pain  Gastrointestinal:	 no nausea, vomiting , diarrhea  Genitourinary:	no dysuria, no difficulty urinating, no hematuria  Musculoskeletal:	no weakness, no joint swelling/pain  Neurological:	no focal weakness/numbness  Endocrine:	no polyuria, no polydipsia      ANTIBIOTICS:  MEDICATIONS  (STANDING):  acetaminophen   Tablet .. 975 milliGRAM(s) Oral every 8 hours  dexAMETHasone  IVPB 10 milliGRAM(s) IV Intermittent every 8 hours  dexAMETHasone 0.1% Ophthalmic Solution for OTIC Use 4 Drop(s) Right Ear two times a day  escitalopram 20 milliGRAM(s) Oral daily  ketorolac   Injectable 15 milliGRAM(s) IV Push every 6 hours  lamoTRIgine 300 milliGRAM(s) Oral daily  levothyroxine 25 MICROGram(s) Oral daily  meropenem  IVPB 1000 milliGRAM(s) IV Intermittent every 8 hours  mesalamine DR Capsule 400 milliGRAM(s) Oral daily  sodium chloride 0.9%. 1000 milliLiter(s) (100 mL/Hr) IV Continuous <Continuous>    MEDICATIONS  (PRN):  ketorolac   Injectable 15 milliGRAM(s) IV Push every 6 hours PRN Severe Pain (7 - 10)  meclizine 12.5 milliGRAM(s) Oral every 6 hours PRN vertigo  sodium chloride 0.65% Nasal 1 Spray(s) Both Nostrils three times a day PRN Nasal Congestion  traMADol 25 milliGRAM(s) Oral daily PRN Moderate Pain (4 - 6)      Allergies    ketamine (Unknown)    Intolerances        SOCIAL HISTORY:    FAMILY HISTORY:      Vital Signs Last 24 Hrs  T(C): 36.7 (14 Aug 2021 14:30), Max: 37 (13 Aug 2021 17:33)  T(F): 98 (14 Aug 2021 14:30), Max: 98.6 (13 Aug 2021 17:33)  HR: 76 (14 Aug 2021 14:30) (62 - 83)  BP: 117/77 (14 Aug 2021 14:30) (108/73 - 139/82)  BP(mean): --  RR: 18 (14 Aug 2021 14:30) (17 - 19)  SpO2: 96% (14 Aug 2021 14:30) (93% - 96%)    PHYSICAL EXAM:  Constitutional:Well-developed, well nourished  Eyes:FERNANDO, EOMI  Ear/Nose/Throat: AD pinna and surrounding soft tissue erythema and and edema  Neck:no JVD, no lymphadenopathy, supple  Respiratory: CTA sameera  Cardiovascular: S1S2 RRR, no murmurs  Gastrointestinal:soft, (+) BS, no HSM  Extremities:no e/e/c  Vascular: DP Pulse:	right normal; left normal            LABS:                        10.4   7.74  )-----------( 296      ( 14 Aug 2021 07:31 )             33.2     08-14    138  |  103  |  7   ----------------------------<  108<H>  4.2   |  26  |  0.65    Ca    9.1      14 Aug 2021 07:31            MICROBIOLOGY: Culture - Surgical Swab (08.12.21 @ 13:00)    Gram Stain:   No organisms seen  No WBC's seen.    -  Aztreonam: R 256    -  Cefepime: R 256    -  Ceftriaxone: R >32    -  Ciprofloxacin: R 32    -  Gentamicin: R 256    -  Levofloxacin: R 32    -  Meropenem: S 1.5    -  Piperacillin/Tazobactam: S 1.0    -  Tobramycin: R >256    Specimen Source: .Surgical Swab R Ear Fluid    Culture Results:   Rare Achromobacter xylosoxidans  Rare Staphylococcus capitis    Organism Identification: Achromobacter xylosoxidans    Organism: Achromobacter xylosoxidans    Method Type: ETEST      RADIOLOGY & ADDITIONAL STUDIES: reviewed

## 2021-08-14 NOTE — PROGRESS NOTE ADULT - PROBLEM SELECTOR PLAN 1
d/t Achromobacter xylosoxidans; cont. meropenem, per ID recs; ear drops and steroids, per ENT; supportive care; f/u CT, cultures; soft diet as tolerated

## 2021-08-14 NOTE — CONSULT NOTE ADULT - ASSESSMENT
32 yo female with UC not on immunosuppressives, recent beach and pool water exposure c/b R otitis externa--culture with MDR Achromobacter.   - d/c Zosyn and start meropenem 1g IV q8h  - d/c Cipro gtts    Discussed with primary team and ENT     ID Team 2

## 2021-08-14 NOTE — PROGRESS NOTE ADULT - PROBLEM SELECTOR PLAN 5
F: mIVF with NS as needed  E: Replete as needed  N: soft for jaw pain   DVT ppx: none indicated  Code status: Full code  Dispo: home with abx pending ID recs

## 2021-08-14 NOTE — DISCHARGE NOTE PROVIDER - NSDCMRMEDTOKEN_GEN_ALL_CORE_FT
Apriso: 375 milligram(s) orally once a day  Ciprodex 0.3%-0.1% otic suspension: 4 drop(s) in each affected ear 2 times a day   ciprofloxacin 750 mg oral tablet: 1 tab(s) orally 2 times a day   LaMICtal: 300 milligram(s) orally once a day  levonorgestrel-ethinyl estradiol with iron 100 mcg-20 mcg oral tablet: 1 tab(s) orally once a day  levothyroxine 25 mcg (0.025 mg) oral tablet: 1 tab(s) orally once a day  Lexapro 20 mg oral tablet: 1 tab(s) orally once a day   Anti-Fungal Liquid 1% topical solution: 4 drop(s) to each affected ear 2 times a day   Apriso: 375 milligram(s) orally once a day  hydrocortisone-acetic acid 1%-2% otic solution: 3 drop(s) in each affected ear every 4 hours   LaMICtal: 300 milligram(s) orally once a day  levonorgestrel-ethinyl estradiol with iron 100 mcg-20 mcg oral tablet: 1 tab(s) orally once a day  levothyroxine 25 mcg (0.025 mg) oral tablet: 1 tab(s) orally once a day  Lexapro 20 mg oral tablet: 1 tab(s) orally once a day   Anti-Fungal Liquid 1% topical solution: 4 drop(s) to each affected ear 2 times a day   Apriso: 375 milligram(s) orally once a day  Bactrim  mg-160 mg oral tablet: 2 tab(s) orally 2 times a day   ciprofloxacin 0.2% otic solution: 4 each in each affected ear 2 times a day   ciprofloxacin 0.2% otic solution: 4 each in each affected ear 2 times a day   ciprofloxacin 750 mg oral tablet: 1 tab(s) orally 2 times a day   hydrocortisone-acetic acid 1%-2% otic solution: 3 drop(s) in each affected ear every 4 hours   LaMICtal: 300 milligram(s) orally once a day  levonorgestrel-ethinyl estradiol with iron 100 mcg-20 mcg oral tablet: 1 tab(s) orally once a day  levothyroxine 25 mcg (0.025 mg) oral tablet: 1 tab(s) orally once a day  Lexapro 20 mg oral tablet: 1 tab(s) orally once a day  prochlorperazine 5 mg oral tablet: 1 tab(s) orally every 6 hours, As Needed -for nausea    Anti-Fungal Liquid 1% topical solution: 4 drop(s) to each affected ear 2 times a day   Apriso: 375 milligram(s) orally once a day  Bactrim  mg-160 mg oral tablet: 2 tab(s) orally 2 times a day   ciprofloxacin 0.2% otic solution: 4 each in each affected ear 2 times a day   ciprofloxacin 750 mg oral tablet: 1 tab(s) orally 2 times a day   hydrocortisone-acetic acid 1%-2% otic solution: 3 drop(s) in each affected ear every 4 hours   LaMICtal: 300 milligram(s) orally once a day  levonorgestrel-ethinyl estradiol with iron 100 mcg-20 mcg oral tablet: 1 tab(s) orally once a day  levothyroxine 25 mcg (0.025 mg) oral tablet: 1 tab(s) orally once a day  Lexapro 20 mg oral tablet: 1 tab(s) orally once a day  prochlorperazine 5 mg oral tablet: 1 tab(s) orally every 6 hours, As Needed -for nausea

## 2021-08-14 NOTE — DISCHARGE NOTE PROVIDER - CARE PROVIDER_API CALL
Stas Wlider)  Otolaryngology  [address] [place] [address], [address]  [place], [place] [address]  Phone: [phone]  Fax: [phone]  Scheduled Appointment: 08/18/2021   Stas Wilder)  Otolaryngology  [address] [place] [address], [address]  [place], [place] [address]  Phone: [phone]  Fax: [phone]  Scheduled Appointment: 08/19/2021

## 2021-08-14 NOTE — PROGRESS NOTE ADULT - ASSESSMENT
A/P:  31y Female seen in [facility] ED yesterday, 8/10, with severe right OE and s/p R ear wick placement presenting with uncontrolled pain and inability to tolerate PO foods. Ear wick in place. Mastoid erythema and tenderness worsened 8/11 so pt came to ED. Area was improving but noted to have some increased erythema 8/13    Plan:   f/u CT temporal bone without contrast   Continue IV abx, recommend ID consult  Ear cultures growing Achromobacter xylosoxidans  Cecommend adding clotrimazole ggt to R ear  Continue ciprodex drops  Pain control per primary team  Can get PRN low dose valium (first-line) and meclizine for vertigo  f/u cultures  No acute ENT intervention  ENT will continue to follow  Please page ENT with any concerns/questions    - A/P:  31y Female seen in [facility] ED yesterday, 8/10, with severe right OE and s/p R ear wick placement presenting with uncontrolled pain and inability to tolerate PO foods. Ear wick in place. Mastoid erythema and tenderness worsened 8/11 so pt came to ED. Area was improving but noted to have some increased erythema 8/13    Plan:   f/u CT temporal bone without contrast   Continue IV abx, recommend ID consult  Ear cultures growing Achromobacter xylosoxidans  Recommend adding clotrimazole ggt to R ear  Continue ciprodex drops  Trend ESR/ CRP  Decadron 10 q8h x48h  Pain control per primary team  Can get PRN low dose valium (first-line) and meclizine for vertigo  f/u cultures  No acute ENT intervention  ENT will continue to follow  Please page ENT with any concerns/questions    -

## 2021-08-15 LAB
-  AMIKACIN: SIGNIFICANT CHANGE UP
-  AZTREONAM: SIGNIFICANT CHANGE UP
-  AZTREONAM: SIGNIFICANT CHANGE UP
-  CEFEPIME: SIGNIFICANT CHANGE UP
-  CEFEPIME: SIGNIFICANT CHANGE UP
-  CEFTRIAXONE: SIGNIFICANT CHANGE UP
-  CIPROFLOXACIN: SIGNIFICANT CHANGE UP
-  CIPROFLOXACIN: SIGNIFICANT CHANGE UP
-  GENTAMICIN: SIGNIFICANT CHANGE UP
-  GENTAMICIN: SIGNIFICANT CHANGE UP
-  LEVOFLOXACIN: SIGNIFICANT CHANGE UP
-  MEROPENEM: SIGNIFICANT CHANGE UP
-  PIPERACILLIN/TAZOBACTAM: SIGNIFICANT CHANGE UP
-  PIPERACILLIN/TAZOBACTAM: SIGNIFICANT CHANGE UP
-  TOBRAMYCIN: SIGNIFICANT CHANGE UP
-  TOBRAMYCIN: SIGNIFICANT CHANGE UP
-  TRIMETHOPRIM/SULFAMETHOXAZOLE: SIGNIFICANT CHANGE UP
ANION GAP SERPL CALC-SCNC: 9 MMOL/L — SIGNIFICANT CHANGE UP (ref 5–17)
BASOPHILS # BLD AUTO: 0.02 K/UL — SIGNIFICANT CHANGE UP (ref 0–0.2)
BASOPHILS NFR BLD AUTO: 0.2 % — SIGNIFICANT CHANGE UP (ref 0–2)
BUN SERPL-MCNC: 7 MG/DL — SIGNIFICANT CHANGE UP (ref 7–23)
CALCIUM SERPL-MCNC: 9.7 MG/DL — SIGNIFICANT CHANGE UP (ref 8.4–10.5)
CHLORIDE SERPL-SCNC: 104 MMOL/L — SIGNIFICANT CHANGE UP (ref 96–108)
CO2 SERPL-SCNC: 26 MMOL/L — SIGNIFICANT CHANGE UP (ref 22–31)
CREAT SERPL-MCNC: 0.53 MG/DL — SIGNIFICANT CHANGE UP (ref 0.5–1.3)
CRP SERPL-MCNC: 79.9 MG/L — HIGH (ref 0–4)
CULTURE RESULTS: SIGNIFICANT CHANGE UP
EOSINOPHIL # BLD AUTO: 0 K/UL — SIGNIFICANT CHANGE UP (ref 0–0.5)
EOSINOPHIL NFR BLD AUTO: 0 % — SIGNIFICANT CHANGE UP (ref 0–6)
ERYTHROCYTE [SEDIMENTATION RATE] IN BLOOD: 87 MM/HR — HIGH
GLUCOSE SERPL-MCNC: 147 MG/DL — HIGH (ref 70–99)
HCT VFR BLD CALC: 36.5 % — SIGNIFICANT CHANGE UP (ref 34.5–45)
HGB BLD-MCNC: 11.5 G/DL — SIGNIFICANT CHANGE UP (ref 11.5–15.5)
IMM GRANULOCYTES NFR BLD AUTO: 0.9 % — SIGNIFICANT CHANGE UP (ref 0–1.5)
LYMPHOCYTES # BLD AUTO: 0.89 K/UL — LOW (ref 1–3.3)
LYMPHOCYTES # BLD AUTO: 8.1 % — LOW (ref 13–44)
MCHC RBC-ENTMCNC: 28 PG — SIGNIFICANT CHANGE UP (ref 27–34)
MCHC RBC-ENTMCNC: 31.5 GM/DL — LOW (ref 32–36)
MCV RBC AUTO: 88.8 FL — SIGNIFICANT CHANGE UP (ref 80–100)
METHOD TYPE: SIGNIFICANT CHANGE UP
METHOD TYPE: SIGNIFICANT CHANGE UP
MONOCYTES # BLD AUTO: 0.19 K/UL — SIGNIFICANT CHANGE UP (ref 0–0.9)
MONOCYTES NFR BLD AUTO: 1.7 % — LOW (ref 2–14)
NEUTROPHILS # BLD AUTO: 9.82 K/UL — HIGH (ref 1.8–7.4)
NEUTROPHILS NFR BLD AUTO: 89.1 % — HIGH (ref 43–77)
NRBC # BLD: 0 /100 WBCS — SIGNIFICANT CHANGE UP (ref 0–0)
ORGANISM # SPEC MICROSCOPIC CNT: SIGNIFICANT CHANGE UP
PLATELET # BLD AUTO: 368 K/UL — SIGNIFICANT CHANGE UP (ref 150–400)
POTASSIUM SERPL-MCNC: 4.5 MMOL/L — SIGNIFICANT CHANGE UP (ref 3.5–5.3)
POTASSIUM SERPL-SCNC: 4.5 MMOL/L — SIGNIFICANT CHANGE UP (ref 3.5–5.3)
RBC # BLD: 4.11 M/UL — SIGNIFICANT CHANGE UP (ref 3.8–5.2)
RBC # FLD: 13.1 % — SIGNIFICANT CHANGE UP (ref 10.3–14.5)
SODIUM SERPL-SCNC: 139 MMOL/L — SIGNIFICANT CHANGE UP (ref 135–145)
SPECIMEN SOURCE: SIGNIFICANT CHANGE UP
WBC # BLD: 11.02 K/UL — HIGH (ref 3.8–10.5)
WBC # FLD AUTO: 11.02 K/UL — HIGH (ref 3.8–10.5)

## 2021-08-15 PROCEDURE — 99233 SBSQ HOSP IP/OBS HIGH 50: CPT | Mod: GC

## 2021-08-15 RX ORDER — DIAZEPAM 5 MG
2 TABLET ORAL EVERY 12 HOURS
Refills: 0 | Status: DISCONTINUED | OUTPATIENT
Start: 2021-08-15 | End: 2021-08-16

## 2021-08-15 RX ORDER — HYDROCORTISONE AND ACETIC ACID 20; 10 MG/ML; MG/ML
3 SOLUTION AURICULAR (OTIC)
Qty: 20 | Refills: 0
Start: 2021-08-15 | End: 2021-08-21

## 2021-08-15 RX ADMIN — Medication 15 MILLIGRAM(S): at 23:13

## 2021-08-15 RX ADMIN — Medication 102 MILLIGRAM(S): at 23:13

## 2021-08-15 RX ADMIN — Medication 400 MILLIGRAM(S): at 05:14

## 2021-08-15 RX ADMIN — Medication 15 MILLIGRAM(S): at 15:29

## 2021-08-15 RX ADMIN — TRAMADOL HYDROCHLORIDE 25 MILLIGRAM(S): 50 TABLET ORAL at 23:13

## 2021-08-15 RX ADMIN — Medication 102 MILLIGRAM(S): at 15:30

## 2021-08-15 RX ADMIN — Medication 975 MILLIGRAM(S): at 00:02

## 2021-08-15 RX ADMIN — MEROPENEM 100 MILLIGRAM(S): 1 INJECTION INTRAVENOUS at 17:27

## 2021-08-15 RX ADMIN — MEROPENEM 100 MILLIGRAM(S): 1 INJECTION INTRAVENOUS at 09:35

## 2021-08-15 RX ADMIN — Medication 15 MILLIGRAM(S): at 05:14

## 2021-08-15 RX ADMIN — Medication 12.5 MILLIGRAM(S): at 05:13

## 2021-08-15 RX ADMIN — Medication 15 MILLIGRAM(S): at 05:36

## 2021-08-15 RX ADMIN — MEROPENEM 100 MILLIGRAM(S): 1 INJECTION INTRAVENOUS at 01:09

## 2021-08-15 RX ADMIN — TRAMADOL HYDROCHLORIDE 25 MILLIGRAM(S): 50 TABLET ORAL at 23:43

## 2021-08-15 RX ADMIN — Medication 15 MILLIGRAM(S): at 23:42

## 2021-08-15 RX ADMIN — Medication 102 MILLIGRAM(S): at 03:13

## 2021-08-15 RX ADMIN — Medication 15 MILLIGRAM(S): at 03:13

## 2021-08-15 RX ADMIN — Medication 25 MICROGRAM(S): at 05:14

## 2021-08-15 RX ADMIN — Medication 15 MILLIGRAM(S): at 09:35

## 2021-08-15 RX ADMIN — ESCITALOPRAM OXALATE 20 MILLIGRAM(S): 10 TABLET, FILM COATED ORAL at 15:28

## 2021-08-15 RX ADMIN — Medication 15 MILLIGRAM(S): at 03:38

## 2021-08-15 RX ADMIN — Medication 975 MILLIGRAM(S): at 15:30

## 2021-08-15 RX ADMIN — LAMOTRIGINE 300 MILLIGRAM(S): 25 TABLET, ORALLY DISINTEGRATING ORAL at 15:29

## 2021-08-15 NOTE — PROGRESS NOTE ADULT - PROBLEM SELECTOR PLAN 4
cont. mesalamine; Pt. has outpatient GI f/u
Patient on OCP at home  -patient holding off on OCP at this time.
Patient on OCP at home  -patient holding off on OCP at this time.
cont. mesalamine; Pt. has outpatient GI f/u
Patient on OCP at home  -patient holding off on OCP at this time.

## 2021-08-15 NOTE — PROGRESS NOTE ADULT - SUBJECTIVE AND OBJECTIVE BOX
Patient is a 31y old  Female who presents with a chief complaint of otitis externa (14 Aug 2021 12:22)      INTERVAL HPI/OVERNIGHT EVENTS:    Pt. seen and examined earlier today  Pt. reports R ear pain is improving  R-sided hearing impaired, unchanged  c/o chest tightness, nausea, and clamminess after trying meclizine for the first time (for vertigo), sx slowly resolving  No F/C    Review of Systems: 12 point review of systems otherwise negative    MEDICATIONS  (STANDING):  acetaminophen   Tablet .. 975 milliGRAM(s) Oral every 8 hours  dexAMETHasone  IVPB 10 milliGRAM(s) IV Intermittent every 8 hours  dexAMETHasone 0.1% Ophthalmic Solution for OTIC Use 4 Drop(s) Right Ear two times a day  escitalopram 20 milliGRAM(s) Oral daily  ketorolac   Injectable 15 milliGRAM(s) IV Push every 6 hours  lamoTRIgine 300 milliGRAM(s) Oral daily  levothyroxine 25 MICROGram(s) Oral daily  meropenem  IVPB 1000 milliGRAM(s) IV Intermittent every 8 hours  mesalamine DR Capsule 400 milliGRAM(s) Oral daily  sodium chloride 0.9%. 1000 milliLiter(s) (100 mL/Hr) IV Continuous <Continuous>    MEDICATIONS  (PRN):  diazepam    Tablet 2 milliGRAM(s) Oral every 12 hours PRN Vertigo  ketorolac   Injectable 15 milliGRAM(s) IV Push every 6 hours PRN Severe Pain (7 - 10)  sodium chloride 0.65% Nasal 1 Spray(s) Both Nostrils three times a day PRN Nasal Congestion  traMADol 25 milliGRAM(s) Oral daily PRN Moderate Pain (4 - 6)      Allergies    ketamine (Unknown)    Intolerances          Vital Signs Last 24 Hrs  T(C): 36.6 (15 Aug 2021 13:23), Max: 37.4 (14 Aug 2021 16:43)  T(F): 97.8 (15 Aug 2021 13:23), Max: 99.4 (14 Aug 2021 16:43)  HR: 73 (15 Aug 2021 13:23) (65 - 75)  BP: 128/62 (15 Aug 2021 13:23) (115/72 - 128/62)  BP(mean): --  RR: 17 (15 Aug 2021 13:23) (16 - 18)  SpO2: 95% (15 Aug 2021 13:23) (95% - 96%)  CAPILLARY BLOOD GLUCOSE          08-14 @ 07:01  -  08-15 @ 07:00  --------------------------------------------------------  IN: 2140 mL / OUT: 1800 mL / NET: 340 mL    08-15 @ 07:01  -  08-15 @ 15:42  --------------------------------------------------------  IN: 360 mL / OUT: 850 mL / NET: -490 mL        Physical Exam:  (earlier today)  Daily     Daily   General:  non-toxic appearing in NAD  HEENT:  R ear postauricular erythema improved per pen markings, pinna TTP w/ less erythema; decreased hearing, unchanged  CV:  RRR  Lungs:  CTA B/L anteriorly  Abdomen:  morbidly obese  Skin:  WWP  Neuro:  AAOx3    LABS:                        11.5   11.02 )-----------( 368      ( 15 Aug 2021 06:55 )             36.5     08-15    139  |  104  |  7   ----------------------------<  147<H>  4.5   |  26  |  0.53    Ca    9.7      15 Aug 2021 06:55

## 2021-08-15 NOTE — PROGRESS NOTE ADULT - SUBJECTIVE AND OBJECTIVE BOX
08-12 Patient seen at bedside and discussed with attending. no acute events overnight. improved erythema this AM. Patient continues to have piercing in R ear.   08-13 Patient seen at bedside and discussed with attending. complaining of pain but post auricular erythema continues to improve. wick removed, fluid sent for culture.  08-14 Patient seen at bedside and discussed with attending. last night erythema noted to be somewhat worse, wick replaced. continues to have pain.   08-15 Patient seen at bedside and discussed with attending. patient reports feeling heavy chested and unwell after being given meclizine. otherwise no acute events. pain improving. WBC incr to 11, likely 2/2 steroids. sensitivities show resistance to cipro but sensitive to shea, shea started and cipro drops dced    ALLERGIES  ketamine (Unknown)    MEDICATIONS  (STANDING):  acetaminophen   Tablet .. 975 milliGRAM(s) Oral every 8 hours  dexAMETHasone  IVPB 10 milliGRAM(s) IV Intermittent every 8 hours  dexAMETHasone 0.1% Ophthalmic Solution for OTIC Use 4 Drop(s) Right Ear two times a day  escitalopram 20 milliGRAM(s) Oral daily  ketorolac   Injectable 15 milliGRAM(s) IV Push every 6 hours  lamoTRIgine 300 milliGRAM(s) Oral daily  levothyroxine 25 MICROGram(s) Oral daily  meropenem  IVPB 1000 milliGRAM(s) IV Intermittent every 8 hours  mesalamine DR Capsule 400 milliGRAM(s) Oral daily  sodium chloride 0.9%. 1000 milliLiter(s) (100 mL/Hr) IV Continuous <Continuous>    MEDICATIONS  (PRN):  ketorolac   Injectable 15 milliGRAM(s) IV Push every 6 hours PRN Severe Pain (7 - 10)  meclizine 12.5 milliGRAM(s) Oral every 6 hours PRN vertigo  sodium chloride 0.65% Nasal 1 Spray(s) Both Nostrils three times a day PRN Nasal Congestion  traMADol 25 milliGRAM(s) Oral daily PRN Moderate Pain (4 - 6)        LABS                         11.5   11.02 )-----------( 368      ( 15 Aug 2021 06:55 )             36.5    08-15    139  |  104  |  7   ----------------------------<  147<H>  4.5   |  26  |  0.53    Ca    9.7      15 Aug 2021 06:55                INs & OUTs  Drains:       VITAL SIGNS:  ICU Vital Signs Last 24 Hrs  T(C): 36.4 (15 Aug 2021 04:55), Max: 37.4 (14 Aug 2021 16:43)  T(F): 97.6 (15 Aug 2021 04:55), Max: 99.4 (14 Aug 2021 16:43)  HR: 65 (15 Aug 2021 04:55) (65 - 76)  BP: 115/72 (15 Aug 2021 04:55) (115/72 - 125/78)  BP(mean): --  ABP: --  ABP(mean): --  RR: 18 (15 Aug 2021 04:55) (18 - 18)  SpO2: 95% (15 Aug 2021 04:55) (95% - 96%)            PHYSICAL EXAM:  ENT EXAM-   Constitutional: Well-developed, well-nourished.  No hoarseness.     Head:  normocephalic, atraumatic.   AD: mastoid with mild erythema, tender to palpation, no fluctuance, pinna mildly erythematous, tender to palpation, no fluctuance, EAC with some purulent drainage, ear wick visible, mild preauricular edema  AS: mastoid flat, non-tender, no erythema, pinna clear, no erythema, EAC clear, no lesions, TM flat, no erythema, no effusion  Nose:  clear anteriorly  Neck:  Tenderness to palpation of R neck levels II-III    < from: CT Temporal Bones No Cont (08.14.21 @ 12:01) >  EXAM:  CT TEMPORAL BONES                          PROCEDURE DATE:  08/14/2021          INTERPRETATION:  PROCEDURE: CT temporal bones    INDICATION: Right ear otitis externa    TECHNIQUE: Contiguous 1 mm thick axial and modified coronal images were obtained through the temporal bones without the intravenous administration of contrast. Target axial and coronal as well as parallel and perpendicular images were created through each temporal bone utilizing bone algorithm.    COMPARISON: CT maxillofacial 8/10/2021    FINDINGS: Target review images of the right temporal bone demonstrates interval progression of soft tissue filling the right external auditory canal and obliterating the canal. The soft tissue is inseparable from the right tympanic membranes. There remains right periauricular soft tissue fullness. The mastoid air cell system is well developed. There is interval progression of soft tissue opacification as well as fluid within right mastoid air cells. The mastoid septa appears intact without erosions. There now is fluid within right mastoid antrum. The right middle middle ear cavity remains opacified. There is a focal area of dehiscence of the right tegmen tympani (series 60013 image 23 and series 85226 image 10). The inner earappears intact.    The involvement of the right temporomandibular joint is better evaluated on the patient's prior study.    Target review images of the left temporal bone demonstrates the mastoid air cell system to be well developed. The mastoid aircells are clear. The visualized portions of the left inner, middle and outer ears appear within normal limits.    The course and caliber of the internal carotid artery, jugular vein and facial nerve within each temporal bones are unremarkable. The internal auditory canals are symmetric in size and configuration. Neither the cochlear nor vestibular aqueducts are enlarged.    Soft-tissue windows fail to demonstrate intracranial abnormality.    IMPRESSION: Interval progression of soft tissue fillingand obliterating the right external auditory canal. Interval progression of right mastoid disease. Focal area of dehiscence of the right tegmen tympani.    < end of copied text >

## 2021-08-15 NOTE — PROGRESS NOTE ADULT - PROBLEM SELECTOR PLAN 1
d/t Achromobacter xylosoxidans; CT reviewed; cont. meropenem, per ID recs; ear drops and steroids, per ENT; supportive care; f/u cultures; soft diet as tolerated; WBC slightly up today, but likely d/t steroids, will monitor CBC

## 2021-08-15 NOTE — PROGRESS NOTE ADULT - PROBLEM SELECTOR PROBLEM 1
Infective otitis externa of right ear
Malignant otitis externa, right ear
Malignant otitis externa, right ear
Infective otitis externa of right ear
Malignant otitis externa, right ear

## 2021-08-15 NOTE — PROGRESS NOTE ADULT - PROBLEM SELECTOR PLAN 3
- continue home Lamictal 300mg daily   - continue Lexapro 20mg daily  -continue home levothyroxine 25mcg daily (per patient, this is for mood stabilization)
cont. Lexapro and Lamictal
cont. Lexapro and Lamictal
- continue home Lamictal 300mg daily   - continue Lexapro 20mg daily  -continue home levothyroxine 25mcg daily (per patient, this is for mood stabilization)
- continue home Lamictal 300mg daily   - continue Lexapro 20mg daily  -continue home levothyroxine 25mcg daily (per patient, this is for mood stabilization)

## 2021-08-15 NOTE — PROGRESS NOTE ADULT - PROBLEM SELECTOR PLAN 2
d/t otitis externa; will avoid meclizine as it made Pt. feel unwell; can try Valium PRN, per ENT recs

## 2021-08-15 NOTE — PROGRESS NOTE ADULT - ASSESSMENT
A/P:  31y Female seen in [facility] ED yesterday, 8/10, with severe right OE and s/p R ear wick placement presenting with uncontrolled pain and inability to tolerate PO foods. Ear wick in place. Mastoid erythema and tenderness worsened 8/11 so pt came to ED. Area was improving but noted to have some increased erythema 8/13    Plan:   CT temporal bone with focal dehiscence of R tegmen tympani, this is likely an incidental finding and normal variant more commonly seen in obese patients, unlikely represents bony involvement  Continue meropenem  Ear cultures growing Achromobacter xylosoxidans  Recommend adding clotrimazole ggt to R ear for broad coverage  Ciprodex dced, recommend Vosol HC drops--these are not on formulary, spoke to patient and these can be sent to the [facility] and brought in by a family member for use  Trend ESR/CRP  Decadron 10 q8h x48h  D/c meclizine, recommend low dose valium for vertigo (highly effective in patients with vestibular symptoms), patient states she is amenable  Pain control per primary team  f/u cultures  No acute ENT intervention  ENT will continue to follow  Please page ENT with any concerns/questions    - A/P:  31y Female seen in [facility] ED yesterday, 8/10, with severe right OE and s/p R ear wick placement presenting with uncontrolled pain and inability to tolerate PO foods. Ear wick in place. Mastoid erythema and tenderness worsened 8/11 so pt came to ED. Area was improving but noted to have some increased erythema 8/13    Plan:   CT temporal bone with focal dehiscence of R tegmen tympani, this is likely an incidental finding and normal variant more commonly seen in obese patients, unlikely represents bony involvement  Continue meropenem  Ear cultures growing Achromobacter xylosoxidans  Recommend adding clotrimazole ggt to R ear for broad coverage  Ciprodex dced, recommend Vosol HC drops--these are not on formulary, spoke to patient and these can be sent to the [facility] and brought in by a family member for use  *** If giving Vosol HC, can d/c dex drops as Vosol HC contains steroids  Trend ESR/CRP  Decadron 10 q8h x48h  D/c meclizine, recommend low dose valium for vertigo (highly effective in patients with vestibular symptoms), patient states she is amenable  Pain control per primary team  f/u cultures  No acute ENT intervention  ENT will continue to follow  Please page ENT with any concerns/questions    - A/P:  31y Female seen in [facility] ED yesterday, 8/10, with severe right OE and s/p R ear wick placement presenting with uncontrolled pain and inability to tolerate PO foods. Ear wick in place. Mastoid erythema and tenderness worsened 8/11 so pt came to ED. Area was improving but noted to have some increased erythema 8/13    Plan:   CT temporal bone with focal dehiscence of R tegmen tympani, this is likely an incidental finding and normal variant more commonly seen in obese patients, unlikely represents bony involvement  Continue meropenem  Ear cultures growing Achromobacter xylosoxidans  Recommend adding clotrimazole ggt to R ear for broad coverage  Ciprodex dced, recommend Vosol HC drops--these are not on formulary, spoke to patient and these can be sent to the [facility] and brought in by a family member for use  *** If giving Vosol HC, can d/c dex drops as Vosol HC contains steroids  Trend ESR/CRP  Decadron 10 q8h x48h  D/c meclizine, recommend low dose valium for vertigo (highly effective in patients with vestibular symptoms), patient states she is amenable  Pain control per primary team  f/u cultures  No acute ENT intervention  ENT will continue to follow  Please page ENT with any concerns/questions    -

## 2021-08-15 NOTE — PROGRESS NOTE ADULT - PROBLEM SELECTOR PROBLEM 5
Morbid obesity
Nutrition, metabolism, and development symptoms
Morbid obesity
Nutrition, metabolism, and development symptoms
Nutrition, metabolism, and development symptoms

## 2021-08-16 ENCOUNTER — TRANSCRIPTION ENCOUNTER (OUTPATIENT)
Age: 31
End: 2021-08-16

## 2021-08-16 VITALS
SYSTOLIC BLOOD PRESSURE: 116 MMHG | DIASTOLIC BLOOD PRESSURE: 71 MMHG | TEMPERATURE: 99 F | OXYGEN SATURATION: 96 % | HEART RATE: 76 BPM | RESPIRATION RATE: 18 BRPM

## 2021-08-16 LAB
ANION GAP SERPL CALC-SCNC: 10 MMOL/L — SIGNIFICANT CHANGE UP (ref 5–17)
BASOPHILS # BLD AUTO: 0.03 K/UL — SIGNIFICANT CHANGE UP (ref 0–0.2)
BASOPHILS NFR BLD AUTO: 0.2 % — SIGNIFICANT CHANGE UP (ref 0–2)
BUN SERPL-MCNC: 13 MG/DL — SIGNIFICANT CHANGE UP (ref 7–23)
CALCIUM SERPL-MCNC: 9.7 MG/DL — SIGNIFICANT CHANGE UP (ref 8.4–10.5)
CHLORIDE SERPL-SCNC: 103 MMOL/L — SIGNIFICANT CHANGE UP (ref 96–108)
CO2 SERPL-SCNC: 25 MMOL/L — SIGNIFICANT CHANGE UP (ref 22–31)
CREAT SERPL-MCNC: 0.56 MG/DL — SIGNIFICANT CHANGE UP (ref 0.5–1.3)
CRP SERPL-MCNC: 41.8 MG/L — HIGH (ref 0–4)
CULTURE RESULTS: SIGNIFICANT CHANGE UP
CULTURE RESULTS: SIGNIFICANT CHANGE UP
EOSINOPHIL # BLD AUTO: 0 K/UL — SIGNIFICANT CHANGE UP (ref 0–0.5)
EOSINOPHIL NFR BLD AUTO: 0 % — SIGNIFICANT CHANGE UP (ref 0–6)
ERYTHROCYTE [SEDIMENTATION RATE] IN BLOOD: 85 MM/HR — HIGH
GLUCOSE SERPL-MCNC: 147 MG/DL — HIGH (ref 70–99)
HCT VFR BLD CALC: 36.6 % — SIGNIFICANT CHANGE UP (ref 34.5–45)
HGB BLD-MCNC: 11.6 G/DL — SIGNIFICANT CHANGE UP (ref 11.5–15.5)
IMM GRANULOCYTES NFR BLD AUTO: 1.2 % — SIGNIFICANT CHANGE UP (ref 0–1.5)
LYMPHOCYTES # BLD AUTO: 1.63 K/UL — SIGNIFICANT CHANGE UP (ref 1–3.3)
LYMPHOCYTES # BLD AUTO: 13 % — SIGNIFICANT CHANGE UP (ref 13–44)
MAGNESIUM SERPL-MCNC: 1.9 MG/DL — SIGNIFICANT CHANGE UP (ref 1.6–2.6)
MCHC RBC-ENTMCNC: 28.2 PG — SIGNIFICANT CHANGE UP (ref 27–34)
MCHC RBC-ENTMCNC: 31.7 GM/DL — LOW (ref 32–36)
MCV RBC AUTO: 89.1 FL — SIGNIFICANT CHANGE UP (ref 80–100)
MONOCYTES # BLD AUTO: 0.25 K/UL — SIGNIFICANT CHANGE UP (ref 0–0.9)
MONOCYTES NFR BLD AUTO: 2 % — SIGNIFICANT CHANGE UP (ref 2–14)
NEUTROPHILS # BLD AUTO: 10.52 K/UL — HIGH (ref 1.8–7.4)
NEUTROPHILS NFR BLD AUTO: 83.6 % — HIGH (ref 43–77)
NRBC # BLD: 0 /100 WBCS — SIGNIFICANT CHANGE UP (ref 0–0)
PLATELET # BLD AUTO: 405 K/UL — HIGH (ref 150–400)
POTASSIUM SERPL-MCNC: 4.4 MMOL/L — SIGNIFICANT CHANGE UP (ref 3.5–5.3)
POTASSIUM SERPL-SCNC: 4.4 MMOL/L — SIGNIFICANT CHANGE UP (ref 3.5–5.3)
RBC # BLD: 4.11 M/UL — SIGNIFICANT CHANGE UP (ref 3.8–5.2)
RBC # FLD: 13.2 % — SIGNIFICANT CHANGE UP (ref 10.3–14.5)
SODIUM SERPL-SCNC: 138 MMOL/L — SIGNIFICANT CHANGE UP (ref 135–145)
SPECIMEN SOURCE: SIGNIFICANT CHANGE UP
SPECIMEN SOURCE: SIGNIFICANT CHANGE UP
WBC # BLD: 12.58 K/UL — HIGH (ref 3.8–10.5)
WBC # FLD AUTO: 12.58 K/UL — HIGH (ref 3.8–10.5)

## 2021-08-16 PROCEDURE — 99285 EMERGENCY DEPT VISIT HI MDM: CPT

## 2021-08-16 PROCEDURE — 36415 COLL VENOUS BLD VENIPUNCTURE: CPT

## 2021-08-16 PROCEDURE — 87040 BLOOD CULTURE FOR BACTERIA: CPT

## 2021-08-16 PROCEDURE — 80048 BASIC METABOLIC PNL TOTAL CA: CPT

## 2021-08-16 PROCEDURE — 87075 CULTR BACTERIA EXCEPT BLOOD: CPT

## 2021-08-16 PROCEDURE — 99232 SBSQ HOSP IP/OBS MODERATE 35: CPT

## 2021-08-16 PROCEDURE — 70480 CT ORBIT/EAR/FOSSA W/O DYE: CPT

## 2021-08-16 PROCEDURE — 93005 ELECTROCARDIOGRAM TRACING: CPT

## 2021-08-16 PROCEDURE — 86140 C-REACTIVE PROTEIN: CPT

## 2021-08-16 PROCEDURE — 87186 SC STD MICRODIL/AGAR DIL: CPT

## 2021-08-16 PROCEDURE — 96374 THER/PROPH/DIAG INJ IV PUSH: CPT

## 2021-08-16 PROCEDURE — 87070 CULTURE OTHR SPECIMN AEROBIC: CPT

## 2021-08-16 PROCEDURE — 87181 SC STD AGAR DILUTION PER AGT: CPT

## 2021-08-16 PROCEDURE — 85025 COMPLETE CBC W/AUTO DIFF WBC: CPT

## 2021-08-16 PROCEDURE — 85652 RBC SED RATE AUTOMATED: CPT

## 2021-08-16 PROCEDURE — 99239 HOSP IP/OBS DSCHRG MGMT >30: CPT | Mod: GC

## 2021-08-16 PROCEDURE — 83735 ASSAY OF MAGNESIUM: CPT

## 2021-08-16 PROCEDURE — 83605 ASSAY OF LACTIC ACID: CPT

## 2021-08-16 PROCEDURE — 85027 COMPLETE CBC AUTOMATED: CPT

## 2021-08-16 PROCEDURE — 86769 SARS-COV-2 COVID-19 ANTIBODY: CPT

## 2021-08-16 PROCEDURE — 87635 SARS-COV-2 COVID-19 AMP PRB: CPT

## 2021-08-16 PROCEDURE — 80053 COMPREHEN METABOLIC PANEL: CPT

## 2021-08-16 RX ORDER — AZTREONAM 2 G
2 VIAL (EA) INJECTION
Qty: 120 | Refills: 0
Start: 2021-08-16 | End: 2021-09-14

## 2021-08-16 RX ORDER — CIPROFLOXACIN LACTATE 400MG/40ML
1 VIAL (ML) INTRAVENOUS
Qty: 16 | Refills: 0
Start: 2021-08-16 | End: 2021-08-23

## 2021-08-16 RX ORDER — CIPROFLOXACIN 6 MG/ML
4 SUSPENSION INTRATYMPANIC
Qty: 64 | Refills: 0
Start: 2021-08-16 | End: 2021-08-23

## 2021-08-16 RX ORDER — CIPROFLOXACIN 6 MG/ML
4 SUSPENSION INTRATYMPANIC
Qty: 240 | Refills: 0
Start: 2021-08-16 | End: 2021-09-14

## 2021-08-16 RX ORDER — PROCHLORPERAZINE MALEATE 5 MG
5 TABLET ORAL ONCE
Refills: 0 | Status: COMPLETED | OUTPATIENT
Start: 2021-08-16 | End: 2021-08-16

## 2021-08-16 RX ORDER — PROCHLORPERAZINE MALEATE 5 MG
0.5 TABLET ORAL
Qty: 28 | Refills: 0
Start: 2021-08-16 | End: 2021-08-29

## 2021-08-16 RX ORDER — AZTREONAM 2 G
2 VIAL (EA) INJECTION
Qty: 32 | Refills: 0
Start: 2021-08-16 | End: 2021-08-23

## 2021-08-16 RX ORDER — CIPROFLOXACIN LACTATE 400MG/40ML
1 VIAL (ML) INTRAVENOUS
Qty: 60 | Refills: 0
Start: 2021-08-16 | End: 2021-09-14

## 2021-08-16 RX ORDER — PROCHLORPERAZINE MALEATE 5 MG
1 TABLET ORAL
Qty: 56 | Refills: 0
Start: 2021-08-16 | End: 2021-08-29

## 2021-08-16 RX ADMIN — LAMOTRIGINE 300 MILLIGRAM(S): 25 TABLET, ORALLY DISINTEGRATING ORAL at 13:28

## 2021-08-16 RX ADMIN — Medication 975 MILLIGRAM(S): at 13:27

## 2021-08-16 RX ADMIN — TRAMADOL HYDROCHLORIDE 25 MILLIGRAM(S): 50 TABLET ORAL at 13:39

## 2021-08-16 RX ADMIN — MEROPENEM 100 MILLIGRAM(S): 1 INJECTION INTRAVENOUS at 09:03

## 2021-08-16 RX ADMIN — MEROPENEM 100 MILLIGRAM(S): 1 INJECTION INTRAVENOUS at 01:06

## 2021-08-16 RX ADMIN — Medication 975 MILLIGRAM(S): at 05:26

## 2021-08-16 RX ADMIN — ESCITALOPRAM OXALATE 20 MILLIGRAM(S): 10 TABLET, FILM COATED ORAL at 13:32

## 2021-08-16 RX ADMIN — Medication 102 MILLIGRAM(S): at 06:10

## 2021-08-16 RX ADMIN — Medication 2 MILLIGRAM(S): at 00:39

## 2021-08-16 RX ADMIN — Medication 975 MILLIGRAM(S): at 04:20

## 2021-08-16 RX ADMIN — Medication 15 MILLIGRAM(S): at 13:27

## 2021-08-16 RX ADMIN — Medication 5 MILLIGRAM(S): at 10:28

## 2021-08-16 RX ADMIN — Medication 15 MILLIGRAM(S): at 04:53

## 2021-08-16 RX ADMIN — Medication 2 MILLIGRAM(S): at 13:38

## 2021-08-16 RX ADMIN — Medication 15 MILLIGRAM(S): at 05:27

## 2021-08-16 RX ADMIN — Medication 400 MILLIGRAM(S): at 06:10

## 2021-08-16 RX ADMIN — Medication 25 MICROGRAM(S): at 06:10

## 2021-08-16 NOTE — PROGRESS NOTE ADULT - ATTENDING COMMENTS
31F with otitis media/externa from MDR Achromobacter and pan sensitive psuedomonas with improvement in symptoms after switching from Zosyn to Meropenem.     1. Otitis media/externa - Continue Meropenem while in patient, downtrending CPR/ESR can stop trending, would transition to PO Cipro and PO Bactrim pending final approval from ID. Patient without signs of fungal infection but can provide acetic acid/hydrocortisone drops to ear. Can continue Ciprodex to ear. F/U ENT recs  2. Vertigo - likely due to infection/soft tissue swelling, continue symptom management, f/u Compazine benefit    Dispo: Patient medically optimized for discharge pending ID approval of possible PO rec, if to continue on IV will attempt to set up for infusions either at infusion center vs at home. Will need ENT outpatient follow up.
31F who presented with necrotizing (malignant) otitis externa after failed oral ciprofloxacin therapy.     A/P  1. Necrotizing Otitis Externa - CT shows signs would not pursue other imaging at this time, Stop Vancomycin as no utility. Stop Topical antibiotics as they add no benefit and decrease sensitivity of culture. Need culture of ear canal to determine cipro sensitivity of pseudomonas. If sensitive patient would go on PO antibiotics for 6-8 weeks, if not would need PICC and IV antibiotics 6-8 weeks. ENT for culture/tissue from canal for sensitivities. Continue Toradol for pain.     Plan discussed with resident team.
31F who presented with otitis media/externa. Concern for necrotizing otitis externa on initial CT read. ENT on board and did not believe true malignant infection involving bone. Spoke with Radiology Attending who also felt that there was only soft tissue swelling around the ear but nothing to suggest necrotizing infection. ENT performed culture which also showed Achromobacter xylosoxidans a known bug that can cause otitis media.     A/P  -Continue Zosyn, no need for Vancomycin pending susceptibilities of Achromobacter xylosoxidans can transition to PO Bactrim  -F/U ESR/CRP in morning and if down trending would forgo further imagining  -If pain still without improvement and/or there is increase in inflammatory markers would consider MRI IAC to evaluate for any potential bone involvement  -Resume ear topical antibiotics now that culture has been obtained    Plan discussed with resident team.

## 2021-08-16 NOTE — PROGRESS NOTE ADULT - SUBJECTIVE AND OBJECTIVE BOX
Internal Medicine Progress Note  Amalia Ramoner, PGY-1  Pager: [phone]    ******INCOMPLETE******    Patient is a 31y old  Female who presents with a chief complaint of otitis externa (14 Aug 2021 12:22)    OVERNIGHT EVENTS/INTERVAL HPI:    OBJECTIVE:  Vital Signs Last 24 Hrs  T(C): 36.6 (16 Aug 2021 08:19), Max: 37.4 (15 Aug 2021 20:30)  T(F): 97.9 (16 Aug 2021 08:19), Max: 99.4 (15 Aug 2021 20:30)  HR: 55 (16 Aug 2021 08:19) (55 - 80)  BP: 132/79 (16 Aug 2021 08:19) (117/59 - 132/79)  BP(mean): --  RR: 17 (16 Aug 2021 08:19) (16 - 17)  SpO2: 98% (16 Aug 2021 08:19) (94% - 98%)  I&O's Detail    15 Aug 2021 07:01  -  16 Aug 2021 07:00  --------------------------------------------------------  IN:    Oral Fluid: 600 mL    sodium chloride 0.9%: 900 mL  Total IN: 1500 mL    OUT:    Voided (mL): 1900 mL  Total OUT: 1900 mL    Total NET: -400 mL        Physical Exam:  GENERAL: Awake, alert and interactive, no acute distress, appears comfortable  NEURO: A&Ox4, no focal deficits, 5/5 strength in all ext, reflexes 2+ throughout  HEENT: Normocephalic, atraumatic, CN2-12 intact, no conjunctivitis or scleral icterus, oral mucosa moist, no oral lesions noted  NECK: Supple, no JVD, no LAD, thyroid not palpable  CARDIAC: Regular rate and rhythm, +S1/S2, no murmurs/rubs/gallops  PULM: Breathing comfortably on RA, clear to auscultation bilaterally, no wheezes/rales/rhonchi, no inspiratory stridor  ABDOMEN: Soft, nontender, nondistended, +bs, no hepatosplenomegaly, no rebound tenderness or fluid wave, no CVA tenderness  : Deferred  MSK: Range of motion grossly intact, no back tenderness  SKIN: Warm and dry, no rashes, no lesions  VASC: 2+ peripheral pulses, no edema  Psych: Appropriate affect    Medications:  MEDICATIONS  (STANDING):  acetaminophen   Tablet .. 975 milliGRAM(s) Oral every 8 hours  dexAMETHasone 0.1% Ophthalmic Solution for OTIC Use 4 Drop(s) Right Ear two times a day  escitalopram 20 milliGRAM(s) Oral daily  lamoTRIgine 300 milliGRAM(s) Oral daily  levothyroxine 25 MICROGram(s) Oral daily  meropenem  IVPB 1000 milliGRAM(s) IV Intermittent every 8 hours  mesalamine DR Capsule 400 milliGRAM(s) Oral daily  prochlorperazine   Injectable 5 milliGRAM(s) IV Push once  sodium chloride 0.9%. 1000 milliLiter(s) (100 mL/Hr) IV Continuous <Continuous>    MEDICATIONS  (PRN):  diazepam    Tablet 2 milliGRAM(s) Oral every 12 hours PRN Vertigo  ketorolac   Injectable 15 milliGRAM(s) IV Push every 6 hours PRN Severe Pain (7 - 10)  sodium chloride 0.65% Nasal 1 Spray(s) Both Nostrils three times a day PRN Nasal Congestion  traMADol 25 milliGRAM(s) Oral daily PRN Moderate Pain (4 - 6)      Labs:                        11.6   12.58 )-----------( 405      ( 16 Aug 2021 07:38 )             36.6     08-16    138  |  103  |  13  ----------------------------<  147<H>  4.4   |  25  |  0.56    Ca    9.7      16 Aug 2021 07:38  Mg     1.9     08-16          COVID-19 PCR: NotDetec (11 Aug 2021 15:48)      Radiology: Reviewed

## 2021-08-16 NOTE — PROGRESS NOTE ADULT - SUBJECTIVE AND OBJECTIVE BOX
INTERVAL HPI/OVERNIGHT EVENTS: AD swelling improved; still having drainage from ear canal.    CONSTITUTIONAL:  Negative fever or chills, feels well, good appetite  EYES:  Negative  blurry vision or double vision  CARDIOVASCULAR:  Negative for chest pain or palpitations  RESPIRATORY:  Negative for cough, wheezing, or SOB   GASTROINTESTINAL:  Negative for nausea, vomiting, diarrhea, constipation, or abdominal pain  GENITOURINARY:  Negative frequency, urgency or dysuria  NEUROLOGIC:  No headache, confusion, dizziness, lightheadedness      ANTIBIOTICS/RELEVANT:    MEDICATIONS  (STANDING):  acetaminophen   Tablet .. 975 milliGRAM(s) Oral every 8 hours  dexAMETHasone 0.1% Ophthalmic Solution for OTIC Use 4 Drop(s) Right Ear two times a day  escitalopram 20 milliGRAM(s) Oral daily  lamoTRIgine 300 milliGRAM(s) Oral daily  levothyroxine 25 MICROGram(s) Oral daily  meropenem  IVPB 1000 milliGRAM(s) IV Intermittent every 8 hours  mesalamine DR Capsule 400 milliGRAM(s) Oral daily  sodium chloride 0.9%. 1000 milliLiter(s) (100 mL/Hr) IV Continuous <Continuous>    MEDICATIONS  (PRN):  diazepam    Tablet 2 milliGRAM(s) Oral every 12 hours PRN Vertigo  ketorolac   Injectable 15 milliGRAM(s) IV Push every 6 hours PRN Severe Pain (7 - 10)  sodium chloride 0.65% Nasal 1 Spray(s) Both Nostrils three times a day PRN Nasal Congestion  traMADol 25 milliGRAM(s) Oral daily PRN Moderate Pain (4 - 6)        Vital Signs Last 24 Hrs  T(C): 37.1 (16 Aug 2021 13:42), Max: 37.4 (15 Aug 2021 20:30)  T(F): 98.7 (16 Aug 2021 13:42), Max: 99.4 (15 Aug 2021 20:30)  HR: 76 (16 Aug 2021 13:42) (55 - 76)  BP: 116/71 (16 Aug 2021 13:42) (116/71 - 132/79)  BP(mean): --  RR: 18 (16 Aug 2021 13:42) (17 - 18)  SpO2: 96% (16 Aug 2021 13:42) (96% - 98%)    PHYSICAL EXAM:  Constitutional:Well-developed, well nourished  Eyes:FERNANDO, EOMI  Ear/Nose/Throat: no oral lesion, no sinus tenderness on percussion	  Neck:no JVD, no lymphadenopathy, supple  Respiratory: CTA sameera  Cardiovascular: S1S2 RRR, no murmurs  Gastrointestinal:soft, (+) BS, no HSM  Extremities:no e/e/c  Vascular: DP Pulse:	right normal; left normal      LABS:                        11.6   12.58 )-----------( 405      ( 16 Aug 2021 07:38 )             36.6     08-16    138  |  103  |  13  ----------------------------<  147<H>  4.4   |  25  |  0.56    Ca    9.7      16 Aug 2021 07:38  Mg     1.9     08-16            MICROBIOLOGY: Culture - Other (08.13.21 @ 06:39)    Gram Stain:   No organisms seen  No WBC's seen.    -  Amikacin: R >32    -  Aztreonam: R >16    -  Aztreonam: S <=4    -  Cefepime: S <=2    -  Cefepime: R >16    -  Ceftriaxone: R >32    -  Ciprofloxacin: R >2    -  Ciprofloxacin: S <=0.25    -  Gentamicin: S <=2    -  Gentamicin: R >8    -  Levofloxacin: R >4    -  Meropenem: S 2    -  Piperacillin/Tazobactam: S <=8    -  Piperacillin/Tazobactam: S <=8    -  Tobramycin: S <=2    -  Tobramycin: R >8    -  Trimethoprim/Sulfamethoxazole: S <=0.5/9.5    Specimen Source: Ear Ear    Culture Results:   Few Pseudomonas aeruginosa  Few Achromobacter xylosoxidans  Rare Staphylococcus hominis    Organism Identification: Pseudomonas aeruginosa  Achromobacter xylosoxidans    Organism: Pseudomonas aeruginosa    Organism: Achromobacter xylosoxidans    Method Type: ROGE  Culture - Surgical Swab (08.12.21 @ 13:00)    Gram Stain:   No organisms seen  No WBC's seen.    -  Ceftriaxone: R >32    -  Ciprofloxacin: R 32    -  Aztreonam: R 256    -  Cefepime: R 256    -  Tobramycin: R >256    -  Meropenem: S 1.5    -  Piperacillin/Tazobactam: S 1.0    -  Gentamicin: R 256    -  Levofloxacin: R 32    Specimen Source: .Surgical Swab R Ear Fluid    Culture Results:   Rare Achromobacter xylosoxidans  Rare Staphylococcus capitis    Organism Identification: Achromobacter xylosoxidans    Organism: Achromobacter xylosoxidans    Method Type: ETEST      Method Type: ROGE        RADIOLOGY & ADDITIONAL STUDIES:

## 2021-08-16 NOTE — DISCHARGE NOTE NURSING/CASE MANAGEMENT/SOCIAL WORK - PATIENT PORTAL LINK FT
You can access the FollowMyHealth Patient Portal offered by [facility] by registering at the following website: [email or web address]. By joining [facility]’s FollowMyHealth portal, you will also be able to view your health information using other applications (apps) compatible with our system.

## 2021-08-16 NOTE — PROGRESS NOTE ADULT - ASSESSMENT
32 yo female with UC not on immunosuppressives, recent beach and pool water exposure c/b R otitis externa--culture with Pseudomonas and MDR Achromobacter.   - can transition from meropenem to Cipro 750mg PO q12h plus TMP/SMX 2 DS q12h both at least through 8/23    Will arrange post hospital f/u with me for 8/23    Discussed with primary team and ENT     ID Team 2

## 2021-08-16 NOTE — PROGRESS NOTE ADULT - TIME BILLING
Management of otitis externa
Dispo: pending clinical progress  case d/w ENT attending Dr. Wilder
Dispo: pending clinical progress

## 2021-08-16 NOTE — PROGRESS NOTE ADULT - SUBJECTIVE AND OBJECTIVE BOX
08-12 Patient seen at bedside and discussed with attending. no acute events overnight. improved erythema this AM. Patient continues to have piercing in R ear.   08-13 Patient seen at bedside and discussed with attending. complaining of pain but post auricular erythema continues to improve. wick removed, fluid sent for culture.  08-14 Patient seen at bedside and discussed with attending. last night erythema noted to be somewhat worse, wick replaced. continues to have pain.   08-15 Patient seen at bedside and discussed with attending. patient reports feeling heavy chested and unwell after being given meclizine. otherwise no acute events. pain improving. WBC incr to 11, likely 2/2 steroids. sensitivities show resistance to cipro but sensitive to shea, shea started and cipro drops dced  08-16 Patient seen and examined at bedside. Complains of some persistent dizziness overnight that is not resolving with valium or meclizine. Yesterday, she blew her nose and her ear wick blew out of her EAC. Otoscopy demonstrated a small TM perforation. Ear wick was replaced. States her pain is improved. Ear wick remains in place. Cultures for pseudomonas shown sensitive to cipro. Currently receiving only meropenem.    ALLERGIES  ketamine (Unknown)    MEDICATIONS  (STANDING):  acetaminophen   Tablet .. 975 milliGRAM(s) Oral every 8 hours  dexAMETHasone 0.1% Ophthalmic Solution for OTIC Use 4 Drop(s) Right Ear two times a day  escitalopram 20 milliGRAM(s) Oral daily  lamoTRIgine 300 milliGRAM(s) Oral daily  levothyroxine 25 MICROGram(s) Oral daily  meropenem  IVPB 1000 milliGRAM(s) IV Intermittent every 8 hours  mesalamine DR Capsule 400 milliGRAM(s) Oral daily  sodium chloride 0.9%. 1000 milliLiter(s) (100 mL/Hr) IV Continuous <Continuous>    MEDICATIONS  (PRN):  diazepam    Tablet 2 milliGRAM(s) Oral every 12 hours PRN Vertigo  ketorolac   Injectable 15 milliGRAM(s) IV Push every 6 hours PRN Severe Pain (7 - 10)  sodium chloride 0.65% Nasal 1 Spray(s) Both Nostrils three times a day PRN Nasal Congestion  traMADol 25 milliGRAM(s) Oral daily PRN Moderate Pain (4 - 6)      08-15    139  |  104  |  7   ----------------------------<  147<H>  4.5   |  26  |  0.53    Ca    9.7      15 Aug 2021 06:55      RECENT CULTURES:  08-13 @ 06:39 Ear Ear Pseudomonas aeruginosa  Achromobacter xylosoxidans    Few Pseudomonas aeruginosa  Few Achromobacter xylosoxidans  Rare Staphylococcus hominis    No organisms seen  No WBC's seen.    08-12 @ 13:00 .Surgical Swab R Ear Fluid Achromobacter xylosoxidans    Rare Achromobacter xylosoxidans  Rare Staphylococcus capitis    No organisms seen  No WBC's seen.    08-11 @ 16:19 .Blood Blood-Peripheral     No growth at 4 days.      Vital Signs Last 24 Hrs  T(C): 36.8 (16 Aug 2021 05:08), Max: 37.4 (15 Aug 2021 20:30)  T(F): 98.2 (16 Aug 2021 05:08), Max: 99.4 (15 Aug 2021 20:30)  HR: 60 (16 Aug 2021 05:08) (60 - 80)  BP: 117/72 (16 Aug 2021 05:08) (117/59 - 128/62)  BP(mean): --  RR: 17 (16 Aug 2021 05:08) (16 - 17)  SpO2: 97% (16 Aug 2021 05:08) (94% - 97%)      PHYSICAL EXAM:  ENT EXAM-   Constitutional: Well-developed, well-nourished.  No hoarseness.     Head:  normocephalic, atraumatic.   AD: mastoid with no erythema, mildly tender to palpation, no fluctuance, pinna clear, tender to movement, no fluctuance, EAC ear wick, TMJ non-tender  AS: mastoid flat, non-tender, no erythema, pinna clear, no erythema, EAC clear, no lesions, TM flat, no erythema, no effusion  Nose:  clear anteriorly  Neck:  soft, non-tender to palpation

## 2021-08-16 NOTE — PROGRESS NOTE ADULT - PROVIDER SPECIALTY LIST ADULT
ENT
Infectious Disease
Internal Medicine
Internal Medicine
ENT
ENT
Internal Medicine
ENT
ENT
Internal Medicine
Hospitalist
Hospitalist

## 2021-08-16 NOTE — PROGRESS NOTE ADULT - ASSESSMENT
A/P:  31y Female seen in [facility] ED yesterday, 8/10, with severe right OE and s/p R ear wick placement presenting with uncontrolled pain and inability to tolerate PO foods. Ear wick in place. Mastoid erythema and tenderness worsened 8/11 so pt came to ED. Area was improving but noted to have some increased erythema 8/13. CT temporal bone (8/16) with focal dehiscence of R tegmen tympani, this is likely an incidental finding and normal variant more commonly seen in obese patients, unlikely represents bony involvement    Plan:   Recommend resume ciprodex drops d/w pseudomonas sensitivity to ciprofloxacin  Recommend compazine IA for vertigo  Recommend adding clotrimazole ggt to R ear for broad coverage- family member will  from [facility]  Continue meropenem  Ear cultures growing Achromobacter xylosoxidans and pseudomonas  Trend ESR/CRP  s/p Decadron 10 q8h x48h  Pain control per primary team  No acute ENT intervention  ENT will continue to follow  Remainder of care per primary team  Please page ENT with any concerns/questions    Discussed with attending, Dr. Wilder

## 2021-08-17 PROBLEM — N80.9 ENDOMETRIOSIS, UNSPECIFIED: Chronic | Status: ACTIVE | Noted: 2021-08-11

## 2021-08-17 PROBLEM — F31.9 BIPOLAR DISORDER, UNSPECIFIED: Chronic | Status: ACTIVE | Noted: 2021-08-11

## 2021-08-17 PROBLEM — K51.90 ULCERATIVE COLITIS, UNSPECIFIED, WITHOUT COMPLICATIONS: Chronic | Status: ACTIVE | Noted: 2021-08-11

## 2021-08-19 ENCOUNTER — APPOINTMENT (OUTPATIENT)
Dept: OTOLARYNGOLOGY | Facility: CLINIC | Age: 31
End: 2021-08-19
Payer: SELF-PAY

## 2021-08-19 VITALS
BODY MASS INDEX: 39.99 KG/M2 | DIASTOLIC BLOOD PRESSURE: 77 MMHG | SYSTOLIC BLOOD PRESSURE: 127 MMHG | HEART RATE: 70 BPM | WEIGHT: 270 LBS | HEIGHT: 69 IN | TEMPERATURE: 97.1 F

## 2021-08-19 DIAGNOSIS — Z80.9 FAMILY HISTORY OF MALIGNANT NEOPLASM, UNSPECIFIED: ICD-10-CM

## 2021-08-19 PROBLEM — N80.9 ENDOMETRIOSIS, UNSPECIFIED: Chronic | Status: ACTIVE | Noted: 2021-08-17

## 2021-08-19 PROBLEM — F31.9 BIPOLAR DISORDER, UNSPECIFIED: Chronic | Status: ACTIVE | Noted: 2021-08-17

## 2021-08-19 PROCEDURE — 69200 CLEAR OUTER EAR CANAL: CPT

## 2021-08-19 PROCEDURE — 92557 COMPREHENSIVE HEARING TEST: CPT

## 2021-08-19 PROCEDURE — 99204 OFFICE O/P NEW MOD 45 MIN: CPT | Mod: 25

## 2021-08-19 PROCEDURE — 92550 TYMPANOMETRY & REFLEX THRESH: CPT

## 2021-08-19 RX ORDER — ESCITALOPRAM OXALATE 5 MG/1
TABLET, FILM COATED ORAL
Refills: 0 | Status: ACTIVE | COMMUNITY

## 2021-08-19 RX ORDER — LAMOTRIGINE 100 MG/1
100 TABLET ORAL
Refills: 0 | Status: ACTIVE | COMMUNITY

## 2021-08-19 RX ORDER — MESALAMINE 375 MG/1
0.38 CAPSULE, EXTENDED RELEASE ORAL
Refills: 0 | Status: ACTIVE | COMMUNITY

## 2021-08-19 NOTE — DATA REVIEWED
[de-identified] : CT temporal bone 8/14/21:\par FINDINGS: Target review images of the right temporal bone demonstrates interval progression of soft tissue filling the right external auditory canal and obliterating the canal. The soft tissue is inseparable from the right tympanic membranes. There remains right periauricular soft tissue fullness. The mastoid air cell system is well developed. There is interval progression of soft tissue opacification as well as fluid within right mastoid air cells. The mastoid septa appears intact without erosions. There now is fluid within right mastoid antrum. The right middle middle ear cavity remains opacified. There is a focal area of dehiscence of the right tegmen tympani (series 29085 image 23 and series 04592 image 10). The inner ear appears intact.\par \par The involvement of the right temporomandibular joint is better evaluated on the patient's prior study.\par \par Target review images of the left temporal bone demonstrates the mastoid air cell system to be well developed. The mastoid air cells are clear. The visualized portions of the left inner, middle and outer ears appear within normal limits.\par \par The course and caliber of the internal carotid artery, jugular vein and facial nerve within each temporal bones are unremarkable. The internal auditory canals are symmetric in size and configuration. Neither the cochlear nor vestibular aqueducts are enlarged.\par \par Soft-tissue windows fail to demonstrate intracranial abnormality.\par \par IMPRESSION: Interval progression of soft tissue filling and obliterating the right external auditory canal. Interval progression of right mastoid disease. Focal area of dehiscence of the right tegmen tympani.\par \par

## 2021-08-19 NOTE — ASSESSMENT
[FreeTextEntry1] : 31F here in first outpatient followup after admission for severe right sided otitis externa. She was admitted 8/12/21 for 4 days in the setting of worsening right sided ear, face and neck pain with diminished hearing and pain opening jaw. She was seen in ED two days prior to admission where a wick had been placed but sx got worse and was admitted and started on IV abx which were changed with cx results and ultimately sent home on bactrim/cipro. CT temporal bone on admission was unremarkable, as above. Since discharge, the pain has continued to improve. However, there is still very muffled right sided hearing and she also feels off-balance and lightheaded. There is no true vertigo. Audiogram from today shows normal right sided hearing sloping to mild conductive loss at 6khz rising to normal hearing with excellent speech discrimination; left sided hearing is normal. On exam, the neck is flat and face is symmetric. The wick was remove from the right EAC showing a patent canal w surrounding erythema; the TM is intact, thickened w overlying debris, but mobile. The external ears are symmetric and nontender. The rest of the head and neck exam is unremarkable.\par She is certainly improving from recent severe otitis externa infection. For now, continue the bactrim/cipro per ID and also continue vosolHC and keep the ear dry. She should continue to improve. I do no think her sx of off-balance and lightheaded are otogenic - there is no true vertigo and TMs are type A so doubt there is middle ear component. As such, I think these sx due to the abx, which end in a few days. RTO 2 weeks for wound check.

## 2021-08-19 NOTE — PHYSICAL EXAM
[de-identified] : soft, flat, full ROM, no erythema [FreeTextEntry1] : Ad: wick removed. EAC patent w surrounding erythema. TM intact, thickened w overlying debris, mobile\par As: EAC clear, TM intact and  mobile, ME clear\par \par external ear symmetric and nontender [Midline] : trachea located in midline position [Normal] : no rashes [de-identified] : face symmetric

## 2021-08-19 NOTE — HISTORY OF PRESENT ILLNESS
[de-identified] : 31F here in first outpatient followup after admission for severe right sided otitis externa.\par \par She was admitted 8/12/21 for 4 days in the setting of worsening right sided ear, face and neck pain with diminished hearing and pain opening jaw. She was seen in ED two days prior where a wick had been placed. She was admitted and started on IV abx which were changed with cx results and ultimately sent home on bactrim/cipro. CT temporal bone on admission was unremarkable.\par Since discharge, the pain has continued to improve. However, there is still very muffled right sided hearing and she also feels off-balance and lightheaded. There is no true vertigo.\par \par Cx:\par -rare achromobacter (only sens to zosyn, shea, bactrim)\par -few pseudomonas (pansens)\par \par Audiogram from today:\par R ear: normal hearing sloping to mild CHL at 6khz rising to normal hearing. SRT 30. 80% discrim, type A\par L ear: normal hearing. SRT 10, 90% discrim, type A\par \par ROS otherwise unremarkable

## 2021-08-20 DIAGNOSIS — B96.5 PSEUDOMONAS (AERUGINOSA) (MALLEI) (PSEUDOMALLEI) AS THE CAUSE OF DISEASES CLASSIFIED ELSEWHERE: ICD-10-CM

## 2021-08-20 DIAGNOSIS — E66.01 MORBID (SEVERE) OBESITY DUE TO EXCESS CALORIES: ICD-10-CM

## 2021-08-20 DIAGNOSIS — Z88.4 ALLERGY STATUS TO ANESTHETIC AGENT: ICD-10-CM

## 2021-08-20 DIAGNOSIS — R59.0 LOCALIZED ENLARGED LYMPH NODES: ICD-10-CM

## 2021-08-20 DIAGNOSIS — R42 DIZZINESS AND GIDDINESS: ICD-10-CM

## 2021-08-20 DIAGNOSIS — D72.829 ELEVATED WHITE BLOOD CELL COUNT, UNSPECIFIED: ICD-10-CM

## 2021-08-20 DIAGNOSIS — T38.0X5A ADVERSE EFFECT OF GLUCOCORTICOIDS AND SYNTHETIC ANALOGUES, INITIAL ENCOUNTER: ICD-10-CM
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
## 2021-08-20 DIAGNOSIS — N80.9 ENDOMETRIOSIS, UNSPECIFIED: ICD-10-CM

## 2021-08-20 DIAGNOSIS — Z79.891 LONG TERM (CURRENT) USE OF OPIATE ANALGESIC: ICD-10-CM

## 2021-08-20 DIAGNOSIS — E03.9 HYPOTHYROIDISM, UNSPECIFIED: ICD-10-CM

## 2021-08-20 DIAGNOSIS — H92.01 OTALGIA, RIGHT EAR: ICD-10-CM
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
## 2021-08-20 DIAGNOSIS — A41.9 SEPSIS, UNSPECIFIED ORGANISM: ICD-10-CM

## 2021-08-20 DIAGNOSIS — Z79.2 LONG TERM (CURRENT) USE OF ANTIBIOTICS: ICD-10-CM

## 2021-08-20 DIAGNOSIS — Z79.890 HORMONE REPLACEMENT THERAPY: ICD-10-CM

## 2021-08-20 DIAGNOSIS — K51.90 ULCERATIVE COLITIS, UNSPECIFIED, WITHOUT COMPLICATIONS: ICD-10-CM

## 2021-08-20 DIAGNOSIS — H72.91 UNSPECIFIED PERFORATION OF TYMPANIC MEMBRANE, RIGHT EAR: ICD-10-CM

## 2021-08-20 DIAGNOSIS — H60.21 MALIGNANT OTITIS EXTERNA, RIGHT EAR: ICD-10-CM

## 2021-08-20 DIAGNOSIS — F31.9 BIPOLAR DISORDER, UNSPECIFIED: ICD-10-CM

## 2021-08-20 DIAGNOSIS — B96.89 OTHER SPECIFIED BACTERIAL AGENTS AS THE CAUSE OF DISEASES CLASSIFIED ELSEWHERE: ICD-10-CM

## 2021-08-20 RX ORDER — TRAMADOL HYDROCHLORIDE 100 MG/1
100 TABLET, COATED ORAL
Qty: 20 | Refills: 0 | Status: ACTIVE | COMMUNITY
Start: 2021-08-20 | End: 1900-01-01

## 2021-09-14 ENCOUNTER — APPOINTMENT (OUTPATIENT)
Dept: OTOLARYNGOLOGY | Facility: CLINIC | Age: 31
End: 2021-09-14
Payer: SELF-PAY

## 2021-09-14 VITALS
WEIGHT: 270 LBS | TEMPERATURE: 97.9 F | HEIGHT: 69 IN | SYSTOLIC BLOOD PRESSURE: 120 MMHG | BODY MASS INDEX: 39.99 KG/M2 | HEART RATE: 71 BPM | DIASTOLIC BLOOD PRESSURE: 75 MMHG

## 2021-09-14 DIAGNOSIS — H60.509 UNSPECIFIED ACUTE NONINFECTIVE OTITIS EXTERNA, UNSPECIFIED EAR: ICD-10-CM

## 2021-09-14 PROCEDURE — 99214 OFFICE O/P EST MOD 30 MIN: CPT

## 2021-09-14 NOTE — HISTORY OF PRESENT ILLNESS
[de-identified] : 31F here in second outpatient followup after admission for severe right sided otitis externa.\par \par She was admitted 8/12/21 for 4 days in the setting of worsening right sided ear, face and neck pain with diminished hearing and pain opening jaw. She was seen in ED two days prior where a wick had been placed. She was admitted and started on IV abx which were changed with cx results and ultimately sent home on bactrim/cipro. CT temporal bone on admission was unremarkable.\par Since last seen, she is much better. There is no discharge. There was popping sensation, but that has also improved and right sided hearing feels normal. Prior off-balance and lightheadedness resolved.\par She finished the cipro/bactrim ~2weeks ago and also the drops around 1.5 weeks ago.\par \par Cx:\par -rare achromobacter (only sens to zosyn, shea, bactrim)\par -few pseudomonas (pansens)\par \par Audiogram from last visit:\par R ear: normal hearing sloping to mild CHL at 6khz rising to normal hearing. SRT 30. 80% discrim, type A\par L ear: normal hearing. SRT 10, 90% discrim, type A\par \par ROS otherwise unremarkable

## 2021-09-14 NOTE — DATA REVIEWED
[de-identified] : Audiogram from 8/19/21:\par R ear: normal hearing sloping to mild CHL at 6khz rising to normal hearing. SRT 30. 80% discrim, type A\par L ear: normal hearing. SRT 10, 90% discrim, type A\par  [de-identified] : CT temporal bone 8/14/21:\par FINDINGS: Target review images of the right temporal bone demonstrates interval progression of soft tissue filling the right external auditory canal and obliterating the canal. The soft tissue is inseparable from the right tympanic membranes. There remains right periauricular soft tissue fullness. The mastoid air cell system is well developed. There is interval progression of soft tissue opacification as well as fluid within right mastoid air cells. The mastoid septa appears intact without erosions. There now is fluid within right mastoid antrum. The right middle middle ear cavity remains opacified. There is a focal area of dehiscence of the right tegmen tympani (series 10484 image 23 and series 63334 image 10). The inner ear appears intact.\par \par The involvement of the right temporomandibular joint is better evaluated on the patient's prior study.\par \par Target review images of the left temporal bone demonstrates the mastoid air cell system to be well developed. The mastoid air cells are clear. The visualized portions of the left inner, middle and outer ears appear within normal limits.\par \par The course and caliber of the internal carotid artery, jugular vein and facial nerve within each temporal bones are unremarkable. The internal auditory canals are symmetric in size and configuration. Neither the cochlear nor vestibular aqueducts are enlarged.\par \par Soft-tissue windows fail to demonstrate intracranial abnormality.\par \par IMPRESSION: Interval progression of soft tissue filling and obliterating the right external auditory canal. Interval progression of right mastoid disease. Focal area of dehiscence of the right tegmen tympani.\par \par

## 2021-09-14 NOTE — PHYSICAL EXAM
[de-identified] : soft, flat, full ROM, no erythema [Midline] : trachea located in midline position [Normal] : no rashes [de-identified] : face symmetric [FreeTextEntry1] : Ad: EAC clear and dry, Small crust removed from over TM. mild erythema, mobile, ME clear\par As: EAC clear, TM intact and  mobile, ME clear\par \par external ear symmetric and nontender

## 2021-09-14 NOTE — ASSESSMENT
[FreeTextEntry1] : 31F here in second outpatient followup after admission for severe right sided otitis externa. She was admitted 8/12/21 for 4 days in the setting of worsening right sided ear, face and neck pain with diminished hearing and pain opening jaw. She was seen in ED two days prior where a wick had been placed. She was admitted and started on IV abx which were changed with cx results and ultimately sent home on bactrim/cipro. CT temporal bone on admission was unremarkable. Since last seen, she is much better. There is no discharge. There was popping sensation, but that has also improved and right sided hearing feels normal. Prior off-balance and lightheadedness resolved. She finished the cipro/bactrim ~2weeks ago and also the drops around 1.5 weeks ago. Exam is much improved - the neck is flat and face is symmetric. The right EAC is widely patent and a small crust was removed from over the TM with improvement in fullness/popping. The rest of the head and neck exam is unremarkable.\par She is much improved from recent severe otitis externa infection. For now, continue to keep ear dry! No further intervention from ENT standpoint. RTO as needed or should she have any recurrent sx. Doing well!

## 2022-02-16 NOTE — H&P ADULT - NSCORESITESY/N_GEN_A_CORE_RD
Mom called to let us know the zio was mailed in about a week ago, Calling for results   [phone]    Spoke with Mom.  Normal study with no ectopy, no symptoms and normal HR range for age.  F/U PRN.     No

## 2022-06-16 ENCOUNTER — APPOINTMENT (OUTPATIENT)
Dept: OPHTHALMOLOGY | Facility: CLINIC | Age: 32
End: 2022-06-16
Payer: COMMERCIAL

## 2022-06-16 ENCOUNTER — NON-APPOINTMENT (OUTPATIENT)
Age: 32
End: 2022-06-16

## 2022-06-16 PROCEDURE — 92014 COMPRE OPH EXAM EST PT 1/>: CPT

## 2022-11-03 NOTE — PROGRESS NOTE ADULT - PROBLEM/PLAN-3
DISPLAY PLAN FREE TEXT
03-Nov-2022

## 2022-11-22 NOTE — ED ADULT TRIAGE NOTE - NSTRIAGECARE_GEN_A_ER
Implemented All Universal Safety Interventions:  [place] to call system. Call bell, personal items and telephone within reach. Instruct patient to call for assistance. Room bathroom lighting operational. Non-slip footwear when patient is off stretcher. Physically safe environment: no spills, clutter or unnecessary equipment. Stretcher in lowest position, wheels locked, appropriate side rails in place.
Face Mask

## 2023-01-16 NOTE — H&P ADULT - ATTENDING SUPERVISION STATEMENT
I called Елена Canales at  and talked with Aroldo Powers, , who informed me that patient does not have current insurance but will be applying for Medicare  Informed Aroldo Powers to find out from patient or family member if they will proceed with scheduling procedure or postpone until patient has insurance  Aroldo Powers will call me back to let me know what they decided to do       Robert Yoon Resident

## 2023-02-03 ENCOUNTER — APPOINTMENT (OUTPATIENT)
Dept: OPHTHALMOLOGY | Facility: CLINIC | Age: 33
End: 2023-02-03
Payer: COMMERCIAL

## 2023-02-03 ENCOUNTER — NON-APPOINTMENT (OUTPATIENT)
Age: 33
End: 2023-02-03

## 2023-02-03 PROCEDURE — 92014 COMPRE OPH EXAM EST PT 1/>: CPT

## 2023-02-22 NOTE — ED ADULT NURSE NOTE - HISTORY OF COVID-19 VACCINATION
lvh V2 hyperkalemia?/normal sinus rhythm, Normal axis, Normal NM interval and QRS complex. There are no acute ischemic ST or T-wave changes. Yes

## 2023-06-15 NOTE — PROGRESS NOTE ADULT - PROBLEM/PLAN-2
DISPLAY PLAN FREE TEXT
intact

## 2023-10-09 ENCOUNTER — APPOINTMENT (OUTPATIENT)
Dept: OPHTHALMOLOGY | Facility: CLINIC | Age: 33
End: 2023-10-09

## 2024-10-09 ENCOUNTER — APPOINTMENT (OUTPATIENT)
Dept: OPHTHALMOLOGY | Facility: CLINIC | Age: 34
End: 2024-10-09

## 2025-03-21 NOTE — ED PROVIDER NOTE - CARE PROVIDER_API CALL
Outcome  Approved today by [employer] 2017 [employer]  Request Reference Number: [id number]. DRONABINOL CAP 2.5MG is approved through 09/21/2025. Your patient may now fill this prescription and it will be covered.  Effective Date: 3/21/2025  Authorization Expiration Date: 9/21/2025  Drug  droNABinol 2.5MG capsules  ePA cloud logo  Form  [employer] Part D Electronic Prior Authorization Form (2017 NCPDP)  Original Claim Info  [id number] Provide Exception Process Printed NoticeTransition N/A,Dr Submit [facility] [email or web address] Review Req`d   Stas Wilder)  Otolaryngology  [address] [place] [address], [address]  [place], [place] [address]  Phone: [phone]  Fax: [phone]  Follow Up Time: